# Patient Record
Sex: MALE | Race: WHITE | NOT HISPANIC OR LATINO | Employment: FULL TIME | URBAN - METROPOLITAN AREA
[De-identification: names, ages, dates, MRNs, and addresses within clinical notes are randomized per-mention and may not be internally consistent; named-entity substitution may affect disease eponyms.]

---

## 2017-03-01 ENCOUNTER — TRANSFERRED RECORDS (OUTPATIENT)
Dept: HEALTH INFORMATION MANAGEMENT | Facility: CLINIC | Age: 42
End: 2017-03-01

## 2017-03-09 ENCOUNTER — TRANSFERRED RECORDS (OUTPATIENT)
Dept: HEALTH INFORMATION MANAGEMENT | Facility: CLINIC | Age: 42
End: 2017-03-09

## 2017-04-07 ENCOUNTER — TRANSFERRED RECORDS (OUTPATIENT)
Dept: HEALTH INFORMATION MANAGEMENT | Facility: CLINIC | Age: 42
End: 2017-04-07

## 2017-04-11 ENCOUNTER — TRANSFERRED RECORDS (OUTPATIENT)
Dept: HEALTH INFORMATION MANAGEMENT | Facility: CLINIC | Age: 42
End: 2017-04-11

## 2017-04-20 ENCOUNTER — TRANSFERRED RECORDS (OUTPATIENT)
Dept: HEALTH INFORMATION MANAGEMENT | Facility: CLINIC | Age: 42
End: 2017-04-20

## 2019-04-29 ENCOUNTER — TRANSFERRED RECORDS (OUTPATIENT)
Dept: HEALTH INFORMATION MANAGEMENT | Facility: CLINIC | Age: 44
End: 2019-04-29

## 2019-11-05 ENCOUNTER — HEALTH MAINTENANCE LETTER (OUTPATIENT)
Age: 44
End: 2019-11-05

## 2020-11-22 ENCOUNTER — HEALTH MAINTENANCE LETTER (OUTPATIENT)
Age: 45
End: 2020-11-22

## 2021-04-28 ENCOUNTER — TRANSFERRED RECORDS (OUTPATIENT)
Dept: HEALTH INFORMATION MANAGEMENT | Facility: CLINIC | Age: 46
End: 2021-04-28

## 2021-09-19 ENCOUNTER — HEALTH MAINTENANCE LETTER (OUTPATIENT)
Age: 46
End: 2021-09-19

## 2021-10-20 ENCOUNTER — TRANSFERRED RECORDS (OUTPATIENT)
Dept: HEALTH INFORMATION MANAGEMENT | Facility: CLINIC | Age: 46
End: 2021-10-20

## 2021-11-10 ENCOUNTER — TRANSFERRED RECORDS (OUTPATIENT)
Dept: HEALTH INFORMATION MANAGEMENT | Facility: CLINIC | Age: 46
End: 2021-11-10

## 2021-11-11 ENCOUNTER — TRANSFERRED RECORDS (OUTPATIENT)
Dept: HEALTH INFORMATION MANAGEMENT | Facility: CLINIC | Age: 46
End: 2021-11-11

## 2022-01-09 ENCOUNTER — HEALTH MAINTENANCE LETTER (OUTPATIENT)
Age: 47
End: 2022-01-09

## 2022-09-30 ENCOUNTER — TRANSFERRED RECORDS (OUTPATIENT)
Dept: HEALTH INFORMATION MANAGEMENT | Facility: CLINIC | Age: 47
End: 2022-09-30

## 2022-10-19 ENCOUNTER — TRANSFERRED RECORDS (OUTPATIENT)
Dept: HEALTH INFORMATION MANAGEMENT | Facility: CLINIC | Age: 47
End: 2022-10-19

## 2022-10-21 ENCOUNTER — TELEPHONE (OUTPATIENT)
Dept: OPHTHALMOLOGY | Facility: CLINIC | Age: 47
End: 2022-10-21

## 2022-10-21 NOTE — TELEPHONE ENCOUNTER
Spoke with patient regarding scheduling for a sooner appointment form the wait-list. Rescheduled patient accordingly and patient is aware of date, time and location.-Per Patient

## 2022-10-24 NOTE — TELEPHONE ENCOUNTER
FUTURE VISIT INFORMATION      FUTURE VISIT INFORMATION:    Date: 10/31/22    Time: 9:00am    Location: [facility]  REFERRAL INFORMATION:    Referring providers clinic:  [facility]    Reason for visit/diagnosis  orbital reconstruction, silent sinus syndrome and sinking eyeball    RECORDS REQUESTED FROM:       Clinic name Comments Records Status Imaging Status   [facility] rec[facility] scanned into chart under 10/19/22 [facility]    Imaging Request sent to [facility] to CT Images

## 2022-10-31 ENCOUNTER — PRE VISIT (OUTPATIENT)
Dept: OPHTHALMOLOGY | Facility: CLINIC | Age: 47
End: 2022-10-31

## 2022-10-31 ENCOUNTER — ANCILLARY PROCEDURE (OUTPATIENT)
Dept: CT IMAGING | Facility: CLINIC | Age: 47
End: 2022-10-31
Attending: OPHTHALMOLOGY
Payer: COMMERCIAL

## 2022-10-31 ENCOUNTER — OFFICE VISIT (OUTPATIENT)
Dept: OPHTHALMOLOGY | Facility: CLINIC | Age: 47
End: 2022-10-31
Payer: COMMERCIAL

## 2022-10-31 DIAGNOSIS — H04.129 DRY EYE AFTER LASER IN SITU KERATOMILEUSIS: ICD-10-CM

## 2022-10-31 DIAGNOSIS — H25.013 CORTICAL AGE-RELATED CATARACT OF BOTH EYES: ICD-10-CM

## 2022-10-31 DIAGNOSIS — H05.411: ICD-10-CM

## 2022-10-31 DIAGNOSIS — H05.411: Primary | ICD-10-CM
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
## 2022-10-31 DIAGNOSIS — H59.89 DRY EYE AFTER LASER IN SITU KERATOMILEUSIS: ICD-10-CM

## 2022-10-31 PROCEDURE — 70480 CT ORBIT/EAR/FOSSA W/O DYE: CPT | Mod: GC | Performed by: RADIOLOGY

## 2022-10-31 PROCEDURE — 92285 EXTERNAL OCULAR PHOTOGRAPHY: CPT | Mod: GC | Performed by: OPHTHALMOLOGY

## 2022-10-31 PROCEDURE — 99204 OFFICE O/P NEW MOD 45 MIN: CPT | Mod: GC | Performed by: OPHTHALMOLOGY

## 2022-10-31 ASSESSMENT — TONOMETRY
OS_IOP_MMHG: 20
OD_IOP_MMHG: 21
IOP_METHOD: ICARE

## 2022-10-31 ASSESSMENT — VISUAL ACUITY
OS_SC+: -3
METHOD: SNELLEN - LINEAR
OD_SC: 20/20
OS_SC: 20/20

## 2022-10-31 ASSESSMENT — CUP TO DISC RATIO
OD_RATIO: 0.5
OS_RATIO: 0.5

## 2022-10-31 ASSESSMENT — CONF VISUAL FIELD
OD_NORMAL: 1
OS_NORMAL: 1
OD_SUPERIOR_NASAL_RESTRICTION: 0
METHOD: COUNTING FINGERS
OS_INFERIOR_NASAL_RESTRICTION: 0
OS_SUPERIOR_TEMPORAL_RESTRICTION: 0
OD_INFERIOR_TEMPORAL_RESTRICTION: 0
OS_SUPERIOR_NASAL_RESTRICTION: 0
OD_INFERIOR_NASAL_RESTRICTION: 0
OS_INFERIOR_TEMPORAL_RESTRICTION: 0
OD_SUPERIOR_TEMPORAL_RESTRICTION: 0

## 2022-10-31 ASSESSMENT — EXTERNAL EXAM - RIGHT EYE: OD_EXAM: ENOPHTHALMOS

## 2022-10-31 ASSESSMENT — SLIT LAMP EXAM - LIDS
COMMENTS: NORMAL
COMMENTS: MILD PERIORBITAL ERYTHEMA

## 2022-10-31 NOTE — TELEPHONE ENCOUNTER
Records Requested   October 31, 2022 10:03 AM  [id number]   Facility  [facility]   Phone: [phone]   Outcome Connected with medical records, imaging disc was mailed out on Friday 10/28:  11/10/2021 CT Sinus completed in house     Will look out for imaging disc, should be arriving sometime this week - Amay

## 2022-10-31 NOTE — PROGRESS NOTES
"Chief Complaints and History of Present Illnesses   Patient presents with     Consult For     Pt here for consult on Silent Sinus Syndrome and a sinking eyeball, referred by [facility].     Chief Complaint(s) and History of Present Illness(es)     Consult For     Additional comments: Pt here for consult on Silent Sinus Syndrome and a   sinking eyeball, referred by [facility].           Comments    Pt had previous sinus surgery for SSS in [place]. Pt has hx of LASIK each   eye. Vision is unchanged since last exam.   OSKAR SCHULZ 8:57 AM October 31, 2022                S/p silent sinus syndrome surgery (right side) 4/2017 in [place]  S/p septoplasty, left submucosal resection, bilateral ethmoidectomies, and bilateral maxillary antrostomies 12/30/21 - [facility]    Patient here to see us for bilateral blurry vision. He feels his vision is fluctuating. If he is still and objects are still then things are clear, but if things are in motion then it is \"blurry.\" currently he says a blurred halo around images. No diplopia or pain with EOMs. Symptoms are worse when driving at night.    He uses budesonide (steroid) nasal rinses for silent sinus syndrome BID since 2017.          Assessment & Plan     Iraj Ghosh is a 47 year old male with the following diagnoses:   1. Enophthalmos of right eye due to silent sinus syndrome    2. 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 Cortical age-related cataract of both eyes         History of silent sinus syndrome  - pt bothered by asymmetry and enophthalmos right eye and is interested in possible surgical correction  - unable to see outside CT from 11/2021. Patient states he has not had a postoperative scan  - baseline CT orbits with alicia protocol today  - pt would like to be referred to ENT at [facility] familiar with silent sinus syndrome. Will have him see Héctor.     Blurry vision, halos  - pt has chronic budesonide nasal spray use BID since 2017 for silent sinus " syndrome  - dilated today to evaluate for PSC cataracts given complaints of halos that are worse driving at night  - use ATs PRN     PLAN:  CT  Héctor  Return to clinic 1 month          Eden Earl MD  Oculoplastics Fellow    Attending Physician Attestation:  I have seen and examined this patient with the fellow .  I have confirmed and edited as necessary the chief complaint(s), history of present illness, review of systems, relevant history, and examination findings as documented by others.  I have personally reviewed the relevant tests, images, and reports as documented above.  I have confirmed and edited as necessary the assessment and plan and agree with this note.    - Davi Neil MD 9:53 AM 10/31/2022

## 2022-10-31 NOTE — TELEPHONE ENCOUNTER
FUTURE VISIT INFORMATION      FUTURE VISIT INFORMATION:    Date: 12/19/22    Time: 11:45am     [place] [facility]  [place]AL INFORMATION:    Referring provider:  Davi Neil MD    Referring providers clinic:  [facility]     Reason for visit/diagnosis  Dr. Jiménez or Dr. Anaya in ENT for silent sinus syndrome-Per     RECORDS REQUESTED FROM:       Clinic name Comments Records Status Imaging Status   [facility] 10/31/22- note from Davi Neil MD [facility]     Imaging  10/31/22- ct orbital        [facility] 11/10/21- ct sinus  Scanned in epic  11/7/22- Pending image    [facility] 6/14/21- note from Caroline Moore MD  Care everywhere                   Records Requested   November 7, 2022 2:39 PM   [facility]    Outcome Faxed request for image to be push to [facility].     11/16/22 3:20pm - Called [facility] to see if they can push image to [facility].- Danita     11/16/22 4:35pm- image in pacs

## 2022-10-31 NOTE — PATIENT INSTRUCTIONS
"ORBITAL FRACTURES    Seven bones of the face form a box that surrounds and protects most of the eye. This box is called the orbit. An orbital fracture is a break in one or more bones that surround the eye. A computed tomograph (\"CT scan\" or \"CAT scan\") is essential to fully characterize any orbital fracture. Above and behind the orbit is the brain. It is uncommon for adults to break the back or roof of the orbit; but if they occur, such fractures must usually be repaired  with the help of a brain surgeon.  The outside (lateral) edge of the orbit borders the temple region. Lateral wall fractures require tremendous force, are often associated with other facial fractures, and are commonly repaired, as most of us tend to lean or sleep against our lateral orbital walls, and repair of these fractures decreases the duration of tenderness.     The front edge of the orbit is called the orbital rim. Displaced fractures of the rim often cause significant changes in the orbital volume, causing the eye to appear sunken into the face. Rim fractures are often repaired for comfort, volume preservation, and to avoid palpable bumps in the bones around the eye.     The walls toward the nose and underneath the eye border the sinus cavities and are called the medial wall and floor, respectively. Fractures of these areas are most common, but not all such fractures need to be repaired. In general, fractures that involve greater than 50 % of the floor or greater than 30 % of the floor in conjunction with large fractures of the medial wall are likely to cause the eye to sink backward or downward and will probably require repair. Indications for urgent repair (as soon as possible) include capture or entrapment of an eye moving muscle into a fracture site (especially in young children), a displaced roof fracture, or a bone fragment pushing on the eye. Indications for delaying repair for at least 4 - 6 weeks include decreased vision from optic " nerve injury, recent eye surgery, or significant injury to the eye. In all other cases, the optimum time for repair is 1 - 2 weeks after injury. Repairs beyond this time are certainly feasible in experienced hands, but they require more work. Floor fractures will often cause a numbness along the cheek, side of the nose, and the lip. Often, the normal nerve function will return after as long as 6 - 9 months. In some cases, repair of a floor fracture may improve nerve function, but can, in rare instances, worsen it.     If you have an orbital fracture, DO NOT BLOW YOUR NOSE for at least 3 weeks. You may cause air to become trapped behind the eye, possibly damaging your vision. Avoid any activity that will turn your face red with exertion for 2 weeks, as you could cause a hemorrhage behind your eye. To speed the resolution of your bruises, apply ice for 48 hours and then warm compresses. Special cases may require antibiotic therapy as well.      If surgery is recommended to you, and you choose to have your fracture repaired, you should know the followin % of all orbital fractures can be repaired with a skin incision that is less than 1/4 of an inch long at the corner of your eye. Surgery is  performed under general anesthesia and requires 30 - 90 minutes of operating time,  depending upon the severity of the injury. The majority of patients go home the same day or stay in the hospital overnight. Most patients can return to normal activities in 1 to 2 weeks.

## 2022-11-01 ENCOUNTER — OFFICE VISIT (OUTPATIENT)
Dept: OPHTHALMOLOGY | Facility: CLINIC | Age: 47
End: 2022-11-01
Attending: OPHTHALMOLOGY
Payer: COMMERCIAL

## 2022-11-01 DIAGNOSIS — Z98.890 H/O LASER ASSISTED IN SITU KERATOMILEUSIS: ICD-10-CM

## 2022-11-01 DIAGNOSIS — H18.513 FUCHS' CORNEAL DYSTROPHY OF BOTH EYES: ICD-10-CM

## 2022-11-01 DIAGNOSIS — H18.523 ANTERIOR BASEMENT MEMBRANE DYSTROPHY (ABMD) OF BOTH EYES: ICD-10-CM

## 2022-11-01 DIAGNOSIS — H25.013 CORTICAL AGE-RELATED CATARACT OF BOTH EYES: Primary | ICD-10-CM

## 2022-11-01 DIAGNOSIS — D31.32 CHOROIDAL NEVUS OF LEFT EYE: ICD-10-CM

## 2022-11-01 PROCEDURE — 92015 DETERMINE REFRACTIVE STATE: CPT

## 2022-11-01 PROCEDURE — G0463 HOSPITAL OUTPT CLINIC VISIT: HCPCS | Mod: 25

## 2022-11-01 PROCEDURE — 92250 FUNDUS PHOTOGRAPHY W/I&R: CPT | Performed by: OPHTHALMOLOGY

## 2022-11-01 PROCEDURE — 92025 CPTRIZED CORNEAL TOPOGRAPHY: CPT | Performed by: OPHTHALMOLOGY

## 2022-11-01 PROCEDURE — 76512 OPH US DX B-SCAN: CPT | Performed by: OPHTHALMOLOGY

## 2022-11-01 PROCEDURE — 76519 ECHO EXAM OF EYE: CPT | Performed by: OPHTHALMOLOGY

## 2022-11-01 PROCEDURE — 99214 OFFICE O/P EST MOD 30 MIN: CPT | Performed by: OPHTHALMOLOGY

## 2022-11-01 RX ORDER — ALBUTEROL SULFATE 90 UG/1
AEROSOL, METERED RESPIRATORY (INHALATION) PRN
COMMUNITY
Start: 2021-12-17

## 2022-11-01 RX ORDER — FLUOXETINE 10 MG/1
10 CAPSULE ORAL
COMMUNITY
Start: 2022-06-22 | End: 2023-05-23

## 2022-11-01 RX ORDER — CLONAZEPAM 0.5 MG/1
TABLET ORAL
COMMUNITY
Start: 2022-10-28 | End: 2023-05-23

## 2022-11-01 RX ORDER — BUDESONIDE 1 MG/2ML
INHALANT ORAL
COMMUNITY
Start: 2022-10-28

## 2022-11-01 RX ORDER — PHENTERMINE HYDROCHLORIDE 37.5 MG/1
TABLET ORAL
COMMUNITY
Start: 2022-10-19

## 2022-11-01 RX ORDER — ZOLPIDEM TARTRATE 5 MG/1
5 TABLET ORAL PRN
COMMUNITY
Start: 2022-06-22 | End: 2023-05-23

## 2022-11-01 ASSESSMENT — REFRACTION_MANIFEST
OD_CYLINDER: +0.50
OD_ADD: +2.50
OS_ADD: +2.50
OS_AXIS: 005
OS_SPHERE: -0.75
OD_AXIS: 140
OD_SPHERE: -0.75
OS_CYLINDER: +0.75

## 2022-11-01 ASSESSMENT — VISUAL ACUITY
OS_SC+: -2
OD_BAT_HIGH: 20/20-2
OS_BAT_MED: 20/25
OD_SC+: -3
OD_BAT_LOW: 20/15
OD_SC: 20/20
OS_SC: 20/20
METHOD: SNELLEN - LINEAR
OS_BAT_LOW: 20/25
OS_BAT_HIGH: 20/30
OD_BAT_MED: 20/20

## 2022-11-01 ASSESSMENT — CONF VISUAL FIELD
METHOD: COUNTING FINGERS
OS_INFERIOR_NASAL_RESTRICTION: 0
OS_NORMAL: 1
OS_SUPERIOR_NASAL_RESTRICTION: 0
OS_INFERIOR_TEMPORAL_RESTRICTION: 0
OD_INFERIOR_NASAL_RESTRICTION: 0
OD_SUPERIOR_TEMPORAL_RESTRICTION: 0
OS_SUPERIOR_TEMPORAL_RESTRICTION: 0
OD_INFERIOR_TEMPORAL_RESTRICTION: 0
OD_NORMAL: 1
OD_SUPERIOR_NASAL_RESTRICTION: 0

## 2022-11-01 ASSESSMENT — TONOMETRY
IOP_METHOD: TONOPEN
OD_IOP_MMHG: 19
OS_IOP_MMHG: 22

## 2022-11-01 ASSESSMENT — CUP TO DISC RATIO
OD_RATIO: 0.5
OS_RATIO: 0.5

## 2022-11-01 ASSESSMENT — EXTERNAL EXAM - LEFT EYE: OS_EXAM: NORMAL

## 2022-11-01 ASSESSMENT — EXTERNAL EXAM - RIGHT EYE: OD_EXAM: ENOPHTHALMOS

## 2022-11-01 NOTE — PROGRESS NOTES
Chief Complaint(s) and History of Present Illness(es)     Cataract Evaluation           Comments    Pt states vision has been blurry both distance and near. Pt does not use   OTC readers for near. Pt s/p Lasik BE 1998.   No redness or dryness. No flashes or floaters.    ANASTASIIA Robbins November 1, 2022 2:16 PM                   Review of systems for the eyes was negative other than the pertinent positives/negatives listed in the HPI.      Assessment & Plan      Iraj Ghosh is a 47 year old male with the following diagnoses:   1. Cortical age-related cataract of both eyes    2. 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 Fuchs' corneal dystrophy of both eyes    4. H/O laser assisted in situ keratomileusis - Both Eyes    5. Choroidal nevus of left eye       New patient here for cataract opinion  Seen at [facility] for many years.  Most recent visit with [facility]  S/P myopic laser in situ keratomileusis (LASIK) in 1998 ([facility]) both eyes   Noting worsening vision left eye > right eye in the past year  Tried soft contact lenses without tolerance or improved vision  Tried progressive bifocals, but did not feel vision improved in either eye    Exam today with minimal cataract in either eye  Discussed findings of K dystrophy as above  Recommend hard lens eval to assess visual potential     Incidental large choroidal nevi noted left eye  Patient does not think this was ever seen prior  Photos and Bscan today without evidence of thickening or orange pigment  Obtain outside records  Refer to retina clinic for eval        Patient disposition:   Return for Retina clinic next available; Deana for hard lens eval with Mcelroy visit after.           Attending Physician Attestation:  Complete documentation of historical and exam elements from today's encounter can be found in the full encounter summary report (not reduplicated in this progress note).  I personally obtained the chief  complaint(s) and history of present illness.  I confirmed and edited as necessary the review of systems, past medical/surgical history, family history, social history, and examination findings as documented by others; and I examined the patient myself.  I personally reviewed the relevant tests, images, and reports as documented above.  I formulated and edited as necessary the assessment and plan and discussed the findings and management plan with the patient and family. I spent a total of 30 minutes face to face with the patient.  Over 50% of this time was spent counseling and coordinating care regarding their diagnosis and management.   . - Alexander Mcelroy MD

## 2022-11-04 ENCOUNTER — TRANSCRIBE ORDERS (OUTPATIENT)
Dept: OTHER | Age: 47
End: 2022-11-04

## 2022-11-04 DIAGNOSIS — J32.0 CHRONIC MAXILLARY SINUSITIS: Primary | ICD-10-CM

## 2022-11-08 ENCOUNTER — TELEPHONE (OUTPATIENT)
Dept: OPHTHALMOLOGY | Facility: CLINIC | Age: 47
End: 2022-11-08

## 2022-11-08 NOTE — TELEPHONE ENCOUNTER
Spoke with patient regarding scheduling for a sooner appointment with  (Dr. Neil appointment has to be after ENT Appointment) and Dr. Wiggins. Patient was able to schedule as offered and was sent a new AVS Printout. Patient will also see appointments in [facility].-Per Patient

## 2022-11-17 ENCOUNTER — OFFICE VISIT (OUTPATIENT)
Dept: OPHTHALMOLOGY | Facility: CLINIC | Age: 47
End: 2022-11-17
Payer: COMMERCIAL

## 2022-11-17 DIAGNOSIS — H18.523 ANTERIOR BASEMENT MEMBRANE DYSTROPHY (ABMD) OF BOTH EYES: ICD-10-CM

## 2022-11-17 DIAGNOSIS — H52.213 IRREGULAR ASTIGMATISM OF BOTH EYES: Primary | ICD-10-CM

## 2022-11-17 PROCEDURE — 99203 OFFICE O/P NEW LOW 30 MIN: CPT | Performed by: OPTOMETRIST

## 2022-11-17 ASSESSMENT — REFRACTION_CURRENTRX
OD_BRAND: VS SCLERAL
OS_BASECURVE: 8.4
OD_DIAMETER: 14.9
OS_SPHERE: -2.50
OS_BRAND: VS SCLERAL
OS_SPHERE: PLANO
OD_DIAMETER: 16.0
OS_BRAND: ONEFIT
OD_BASECURVE: 7.6
OS_DIAMETER: 14.9
OS_DIAMETER: 16.0
OD_SPHERE: -3.00
OD_BRAND: ONEFIT
OS_BASECURVE: 7.7
OD_BASECURVE: 8.4
OD_SPHERE: PLANO

## 2022-11-17 ASSESSMENT — VISUAL ACUITY
OS_SC+: -3
METHOD: SNELLEN - LINEAR
OD_SC+: -3
OD_SC: 20/20
OS_SC: 20/20

## 2022-11-17 ASSESSMENT — CONF VISUAL FIELD
OD_INFERIOR_TEMPORAL_RESTRICTION: 0
OD_NORMAL: 1
OS_SUPERIOR_TEMPORAL_RESTRICTION: 0
OD_SUPERIOR_TEMPORAL_RESTRICTION: 0
OD_SUPERIOR_NASAL_RESTRICTION: 0
OS_INFERIOR_NASAL_RESTRICTION: 0
OS_INFERIOR_TEMPORAL_RESTRICTION: 0
OS_NORMAL: 1
OS_SUPERIOR_NASAL_RESTRICTION: 0
OD_INFERIOR_NASAL_RESTRICTION: 0

## 2022-11-17 ASSESSMENT — EXTERNAL EXAM - RIGHT EYE: OD_EXAM: ENOPHTHALMOS

## 2022-11-17 ASSESSMENT — TONOMETRY
OS_IOP_MMHG: 15
IOP_METHOD: ICARE
OD_IOP_MMHG: 15

## 2022-11-17 ASSESSMENT — SLIT LAMP EXAM - LIDS
COMMENTS: DERMATOCHALASIS
COMMENTS: DERMATOCHALASIS

## 2022-11-17 ASSESSMENT — EXTERNAL EXAM - LEFT EYE: OS_EXAM: NORMAL

## 2022-11-17 NOTE — PROGRESS NOTES
"A/P  1.) Irregular astigmatism OU  -Likely 2' to ABMD OU  -Symptomatic for blurred vision, not \"crisp\" left eye>right eye  -Scleral lens trial today, did appreciate increased clarity on trial frame with lenses. Required FST left eye>right eye  -Reviewed findings with pt including likely need for several lens revisions. Aim for DVO and readers over. He would like to proceed    Order lenses, RTC 2-3 weeks for lens dispense, I&R    I have confirmed the patient's CC, HPI and reviewed Past Medical History, Past Surgical History, Social History, Family History, Problem List, Medication List and agree with Tech note.     Li Wiggins, OD FAAO FSLS    "

## 2022-11-17 NOTE — NURSING NOTE
Chief Complaints and History of Present Illnesses   Patient presents with     Contact Lens Evaluation     Chief Complaint(s) and History of Present Illness(es)     Contact Lens Evaluation            Associated symptoms: Negative for eye pain, redness, flashes and floaters          Comments    Patient states he tried contact lenses about 3 months ago and states his vision was still blurry each eye. Patient states the lenses were comfortable but he didn't see well out of them. Patient was told his cornea is a shape that he might need to try a hard lens.     Ruthie Collado, OSKAR November 17, 2022 9:36 AM

## 2022-11-17 NOTE — PATIENT INSTRUCTIONS
What is a scleral lens?  A scleral lens is a large diameter rigid gas permeable contact lens that vaults completely over the cornea (clear tissue on the front of the eye) and touches only on the white part of the eye (sclera). A liquid layer between the lens and the cornea lubricates the ocular surface and protects from physical rubbing/irritation on the sensitive cornea. These lenses are helpful for a variety of conditions including dry eyes and irregularly shaped corneas.    How are they different than small RGP lenses?  Scleral lenses do not move much in the eye and do not touch the sensitive cornea, so they tend to be much easier to get used to. Insertion is quite different, but removal is similar. To insert them properly they need to be filled with preservative free saline    How do you insert and remove them?  See this website for video demonstration: https://sclerallens.org/for-patients/patient-videos/   We will start you out with all the solutions you need at first, but you will need to buy more if you continue to wear lenses.    How long do they last?  Once the final fit is obtained (the best lens shape for your eye may require several revisions), lenses can last from 1-2 years depending on your eyes.    Does insurance cover them?  Sometimes - this is very dependent on your insurance and which diagnoses they are for. It is usually best if you check directly with your insurance, however we are able to help you with this: call [phone]. We always submit medically necessary lenses to insurance but if your insurance does not cover them you may be responsible for them.     What happens if I lose or break a lens?  In the first 90 days after initial order you are covered under the manufacturing lab's warranty. Please just call the clinic to let us know. After 90 days we typically need to order a new lens, which will carry a new charge. Custom lenses take 1-2 weeks to arrive, so if you cannot function well  without a lens it may be worth getting a backup.

## 2022-11-20 ENCOUNTER — HEALTH MAINTENANCE LETTER (OUTPATIENT)
Age: 47
End: 2022-11-20

## 2022-11-21 ENCOUNTER — OFFICE VISIT (OUTPATIENT)
Dept: OTOLARYNGOLOGY | Facility: CLINIC | Age: 47
End: 2022-11-21
Payer: COMMERCIAL

## 2022-11-21 VITALS
HEIGHT: 70 IN | DIASTOLIC BLOOD PRESSURE: 91 MMHG | SYSTOLIC BLOOD PRESSURE: 133 MMHG | RESPIRATION RATE: 16 BRPM | BODY MASS INDEX: 33.5 KG/M2 | HEART RATE: 71 BPM | TEMPERATURE: 97.8 F | WEIGHT: 234 LBS | OXYGEN SATURATION: 98 %

## 2022-11-21 DIAGNOSIS — J34.89 SILENT SINUS SYNDROME: Primary | ICD-10-CM

## 2022-11-21 DIAGNOSIS — J32.0 CHRONIC MAXILLARY SINUSITIS: ICD-10-CM

## 2022-11-21 PROCEDURE — 99202 OFFICE O/P NEW SF 15 MIN: CPT | Mod: 25 | Performed by: OTOLARYNGOLOGY

## 2022-11-21 PROCEDURE — 31231 NASAL ENDOSCOPY DX: CPT | Performed by: OTOLARYNGOLOGY

## 2022-11-21 ASSESSMENT — PAIN SCALES - GENERAL: PAINLEVEL: NO PAIN (0)

## 2022-11-21 NOTE — PATIENT INSTRUCTIONS
1.  You were seen in the ENT Clinic today by . If you have any questions or concerns after your appointment, please call [phone]. Press option #1 for scheduling related needs. Press option #3 for Nurse advice.    2.  Plan is to return to clinic as needed.      Beatriz Turner LPN  [phone]  [facility] Otolaryngology

## 2022-11-21 NOTE — LETTER
2022       RE: Iraj Ghohs  [address] [place] [address]     Dear Colleague,    Thank you for referring your patient, Iraj Ghosh, to the [facility] [place] at [facility]. Please see a copy of my visit note below.    Otolaryngology Adult Consultation    Patient: Iraj Ghosh   : 1975     Patient presents with:  Consult: No chief complaint on file.       Referring Provider: Provider Not In System   Consulting Physician:  Louisa Jiménez MD       Assessment/Plan: Patient with treated silent sinus syndrome. Imaging and endoscopy confirm patency of sinus. For ongoing intermittent sinus symptoms, recommend saline irrigations and nasal steroid spray. Patient comfortable with this plan. Will see Dr. Neil to discuss further management of the orbit.      HPI: Iraj Ghosh is a 47 year old male seen today in the Otolaryngology Clinic in consultation from Dr. Neil in oculoplastics for history of silent sinus syndrome. Several years ago this was identified and treated in [place] in . He also describes a second procedure done with [facility], septoplasty, sinus and turbinate surgery in . He wants to be sure the silent sinus syndrome is adequately treated and to discuss his current sinus symptoms.     budesonide (PULMICORT) 1 MG/2ML neb solution,   clonazePAM (KLONOPIN) 0.5 MG tablet,   FLUoxetine (PROZAC) 10 MG capsule, Take 10 mg by mouth  phentermine (ADIPEX-P) 37.5 MG tablet,   VENTOLIN  (90 Base) MCG/ACT inhaler, as needed  vitamin D3 (CHOLECALCIFEROL) 1.25 MG (54259 UT) capsule, Take 50,000 Units by mouth  zolpidem (AMBIEN) 5 MG tablet, Take 5 mg by mouth as needed    No current facility-administered medications on file prior to visit.       No Known Allergies    Past Medical History:   Diagnosis Date     Allergic rhinitis, cause unspecified      Anxiety      Depressive disorder       Diaphragmatic hernia without mention of obstruction or gangrene      Gastro-oesophageal reflux disease      Perforated abdominal viscus 3/10/2015     Reflux esophagitis      Rosacea      Sepsis, unspecified 3/19/2015    Sepsis due to perforated diverticulitis with abscess.      Stricture and stenosis of esophagus        Social History     Occupational History     Not on file   Tobacco Use     Smoking status: Former     Types: Dip, chew, snus or snuff     Smokeless tobacco: Former     Types: Chew   Substance and Sexual Activity     Alcohol use: Not Currently     Comment: 12 weekly     Drug use: Not Currently     Types: Marijuana     Sexual activity: Yes     Partners: Male     Birth control/protection: Condom, None        Review of Systems  [facility] ROS 11/16/2022   Constitutional: Unexplained fatigue   Neurology: Dizzy spells, Headache   Eyes: Visual loss   Ears, Nose, Throat: Ringing/noise in ears, Nasal congestion or drainage        14 point ROS neg other than the symptoms noted above.    Physical Exam:    General Assessment   The patient is alert, oriented and in no acute distress.   Head/Face/Scalp  Grossly normal. Asymmetry of eye closure, mild R hypophthalmus.   Nose  External nose is straight, skin is normal. Intranasal exam (anterior rhinoscopy) reveals normal moist mucosa, turbinate tissue without edema, erythema or crusting.  Septum mainly in the midline.      Procedure:  Endoscopy indicated for exam of sinuses  Topical anesthetic/decongestant spray applied.  Rigid scope used for visualization.  Findings: used zero and 70 degree scopes. Sinus is patent on both sides, no need for further surgical correction.     Imaging:    Most recent CT shows mucosal thickening, resolved on exam today and absent on prior CT.  Mild mucosal thickening in R ethmoid/frontal recess.     20 minutes spent on the date of the encounter doing chart review, history and exam, documentation and further activities per the note. This time  is in addition to separately billable procedure.      Again, thank you for allowing me to participate in the care of your patient.      Sincerely,    Louisa Jiménez MD

## 2022-11-21 NOTE — PROGRESS NOTES
Otolaryngology Adult Consultation    Patient: Iraj Ghosh   : 1975     Patient presents with:  Consult: No chief complaint on file.       Referring Provider: Provider Not In System   Consulting Physician:  Louisa Jiménez MD       Assessment/Plan: Patient with treated silent sinus syndrome. Imaging and endoscopy confirm patency of sinus. For ongoing intermittent sinus symptoms, recommend saline irrigations and nasal steroid spray. Patient comfortable with this plan. Will see Dr. Neil to discuss further management of the orbit.      HPI: Iraj Ghosh is a 47 year old male seen today in the Otolaryngology Clinic in consultation from Dr. Neil in oculoplastics for history of silent sinus syndrome. Several years ago this was identified and treated in [place] in . He also describes a second procedure done with [facility], septoplasty, sinus and turbinate surgery in . He wants to be sure the silent sinus syndrome is adequately treated and to discuss his current sinus symptoms.     budesonide (PULMICORT) 1 MG/2ML neb solution,   clonazePAM (KLONOPIN) 0.5 MG tablet,   FLUoxetine (PROZAC) 10 MG capsule, Take 10 mg by mouth  phentermine (ADIPEX-P) 37.5 MG tablet,   VENTOLIN  (90 Base) MCG/ACT inhaler, as needed  vitamin D3 (CHOLECALCIFEROL) 1.25 MG (19641 UT) capsule, Take 50,000 Units by mouth  zolpidem (AMBIEN) 5 MG tablet, Take 5 mg by mouth as needed    No current facility-administered medications on file prior to visit.       No Known Allergies    Past Medical History:   Diagnosis Date     Allergic rhinitis, cause unspecified      Anxiety      Depressive disorder      Diaphragmatic hernia without mention of obstruction or gangrene      Gastro-oesophageal reflux disease      Perforated abdominal viscus 3/10/2015     Reflux esophagitis      Rosacea      Sepsis, unspecified 3/19/2015    Sepsis due to perforated diverticulitis with abscess.      Stricture and stenosis of esophagus         Social History     Occupational History     Not on file   Tobacco Use     Smoking status: Former     Types: Dip, chew, snus or snuff     Smokeless tobacco: Former     Types: Chew   Substance and Sexual Activity     Alcohol use: Not Currently     Comment: 12 weekly     Drug use: Not Currently     Types: Marijuana     Sexual activity: Yes     Partners: Male     Birth control/protection: Condom, None        Review of Systems  [facility] ROS 11/16/2022   Constitutional: Unexplained fatigue   Neurology: Dizzy spells, Headache   Eyes: Visual loss   Ears, Nose, Throat: Ringing/noise in ears, Nasal congestion or drainage        14 point ROS neg other than the symptoms noted above.    Physical Exam:    General Assessment   The patient is alert, oriented and in no acute distress.   Head/Face/Scalp  Grossly normal. Asymmetry of eye closure, mild R hypophthalmus.   Nose  External nose is straight, skin is normal. Intranasal exam (anterior rhinoscopy) reveals normal moist mucosa, turbinate tissue without edema, erythema or crusting.  Septum mainly in the midline.      Procedure:  Endoscopy indicated for exam of sinuses  Topical anesthetic/decongestant spray applied.  Rigid scope used for visualization.  Findings: used zero and 70 degree scopes. Sinus is patent on both sides, no need for further surgical correction.     Imaging:    Most recent CT shows mucosal thickening, resolved on exam today and absent on prior CT.  Mild mucosal thickening in R ethmoid/frontal recess.     20 minutes spent on the date of the encounter doing chart review, history and exam, documentation and further activities per the note. This time is in addition to separately billable procedure.

## 2022-11-29 DIAGNOSIS — D31.32 CHOROIDAL NEVUS OF LEFT EYE: Primary | ICD-10-CM

## 2022-12-07 ENCOUNTER — OFFICE VISIT (OUTPATIENT)
Dept: OPHTHALMOLOGY | Facility: CLINIC | Age: 47
End: 2022-12-07
Attending: OPHTHALMOLOGY
Payer: COMMERCIAL

## 2022-12-07 ENCOUNTER — OFFICE VISIT (OUTPATIENT)
Dept: OPTOMETRY | Facility: CLINIC | Age: 47
End: 2022-12-07
Payer: COMMERCIAL

## 2022-12-07 DIAGNOSIS — H18.523 ANTERIOR BASEMENT MEMBRANE DYSTROPHY (ABMD) OF BOTH EYES: ICD-10-CM
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
## 2022-12-07 DIAGNOSIS — D31.32 CHOROIDAL NEVUS OF LEFT EYE: Primary | ICD-10-CM

## 2022-12-07 DIAGNOSIS — H04.129 DRY EYE AFTER LASER IN SITU KERATOMILEUSIS: ICD-10-CM

## 2022-12-07 DIAGNOSIS — D31.32 CHOROIDAL NEVUS OF LEFT EYE: ICD-10-CM

## 2022-12-07 DIAGNOSIS — H59.89 DRY EYE AFTER LASER IN SITU KERATOMILEUSIS: ICD-10-CM

## 2022-12-07 DIAGNOSIS — Z98.890 H/O LASER ASSISTED IN SITU KERATOMILEUSIS: ICD-10-CM

## 2022-12-07 DIAGNOSIS — H52.213 IRREGULAR ASTIGMATISM OF BOTH EYES: Primary | ICD-10-CM

## 2022-12-07 DIAGNOSIS — H25.13 NUCLEAR SENILE CATARACT OF BOTH EYES: ICD-10-CM

## 2022-12-07 PROCEDURE — 92014 COMPRE OPH EXAM EST PT 1/>: CPT | Performed by: OPHTHALMOLOGY

## 2022-12-07 PROCEDURE — 92134 CPTRZ OPH DX IMG PST SGM RTA: CPT | Performed by: OPHTHALMOLOGY

## 2022-12-07 PROCEDURE — G0463 HOSPITAL OUTPT CLINIC VISIT: HCPCS | Mod: 25

## 2022-12-07 PROCEDURE — 92250 FUNDUS PHOTOGRAPHY W/I&R: CPT | Performed by: OPHTHALMOLOGY

## 2022-12-07 PROCEDURE — 99213 OFFICE O/P EST LOW 20 MIN: CPT | Mod: 25 | Performed by: OPHTHALMOLOGY

## 2022-12-07 PROCEDURE — 76512 OPH US DX B-SCAN: CPT | Performed by: OPHTHALMOLOGY

## 2022-12-07 PROCEDURE — 999N000103 HC STATISTIC NO CHARGE FACILITY FEE

## 2022-12-07 ASSESSMENT — REFRACTION_CURRENTRX
OS_AXIS: 035
OS_CYLINDER: -1.25
OS_ADDL_SPECS: OPT EXTRA BLUE, HYDRAPEG
OS_BASECURVE: 4.2/7.67
OD_BASECURVE: 4.1/7.5
OD_SPHERE: -1.25
OD_AXIS: 140
OS_DIAMETER: 15.4
OD_CYLINDER: -0.75
OD_DIAMETER: 14.9
OS_DIAMETER: 14.9
OD_SPHERE: -2.00
OS_SPHERE: -1.12
OD_DIAMETER: 14.8
OS_BASECURVE: 7.9
OS_BRAND: ONEFIT (AXIS ADJUSTED)
OD_BRAND: ONEFIT (AXIS ADJUSTED)
OD_ADDL_SPECS: OPT EXTRA CLEAR, HYDRAPEG
OS_SPHERE: -2.00
OD_BASECURVE: 7.9

## 2022-12-07 ASSESSMENT — CONF VISUAL FIELD
OD_INFERIOR_TEMPORAL_RESTRICTION: 0
OD_SUPERIOR_NASAL_RESTRICTION: 0
OD_NORMAL: 1
OS_SUPERIOR_TEMPORAL_RESTRICTION: 0
OS_INFERIOR_TEMPORAL_RESTRICTION: 0
OS_SUPERIOR_TEMPORAL_RESTRICTION: 0
OD_SUPERIOR_TEMPORAL_RESTRICTION: 0
OS_NORMAL: 1
OS_NORMAL: 1
OS_INFERIOR_NASAL_RESTRICTION: 0
OD_SUPERIOR_NASAL_RESTRICTION: 0
OD_SUPERIOR_TEMPORAL_RESTRICTION: 0
OD_NORMAL: 1
OD_INFERIOR_TEMPORAL_RESTRICTION: 0
OD_INFERIOR_NASAL_RESTRICTION: 0
OS_SUPERIOR_NASAL_RESTRICTION: 0
OS_INFERIOR_TEMPORAL_RESTRICTION: 0
OS_INFERIOR_NASAL_RESTRICTION: 0
OD_INFERIOR_NASAL_RESTRICTION: 0
OS_SUPERIOR_NASAL_RESTRICTION: 0

## 2022-12-07 ASSESSMENT — EXTERNAL EXAM - LEFT EYE
OS_EXAM: NORMAL
OS_EXAM: NORMAL

## 2022-12-07 ASSESSMENT — VISUAL ACUITY
METHOD: SNELLEN - LINEAR
OS_SC: 20/25
METHOD: SNELLEN - LINEAR
OD_SC: 20/25
OD_SC: 20/25
OS_SC: 20/25
OD_SC: 20/25
METHOD: SNELLEN - LINEAR
OS_SC: 20/25

## 2022-12-07 ASSESSMENT — CUP TO DISC RATIO
OS_RATIO: 0.5
OD_RATIO: 0.5

## 2022-12-07 ASSESSMENT — SLIT LAMP EXAM - LIDS
COMMENTS: DERMATOCHALASIS

## 2022-12-07 ASSESSMENT — EXTERNAL EXAM - RIGHT EYE
OD_EXAM: ENOPHTHALMOS
OD_EXAM: ENOPHTHALMOS

## 2022-12-07 ASSESSMENT — TONOMETRY
OD_IOP_MMHG: 14
OD_IOP_MMHG: 14
OS_IOP_MMHG: 15
OS_IOP_MMHG: 15
IOP_METHOD: ICARE
OS_IOP_MMHG: 19
IOP_METHOD: TONOPEN
OD_IOP_MMHG: 19
IOP_METHOD: TONOPEN

## 2022-12-07 NOTE — PROGRESS NOTES
Chief Complaint(s) and History of Present Illness(es)     Follow Up            Laterality: both eyes    Quality: blurred vision    Frequency: constantly    Timing: throughout the day    Associated symptoms: Negative for eye pain, pain with eye movement,   flashes and floaters    Treatments tried: no treatments    Pain scale: 0/10    Comments: Blurred vision BE   Seen Dr Wiggins today to get scleral lens dispensed. Pt states did not   work out poor fit she will reorder new lenses.   Has evaluation w retina also.  Wilma SCHMITT 9:32 AM December 7, 2022                     Review of systems for the eyes was negative other than the pertinent positives/negatives listed in the HPI.      Assessment & Plan      Iraj Ghosh is a 47 year old male with the following diagnoses:   1. Irregular astigmatism of both eyes    2. 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 H/O laser assisted in situ keratomileusis - Both Eyes    4. Choroidal nevus of left eye         Improved vision with scleral lens.  Continuing to work on fit with Dr. MILLER  Cataracts minimal and not visually significant at this time  Monitor     Seen by Dr. Mcmahon for choroidal nevi left eye today  Plans to monitor Q6 mo      Patient disposition:   Continue to follow with [facility].  Can return to [facility] as needed          Attending Physician Attestation:  Complete documentation of historical and exam elements from today's encounter can be found in the full encounter summary report (not reduplicated in this progress note).  I personally obtained the chief complaint(s) and history of present illness.  I confirmed and edited as necessary the review of systems, past medical/surgical history, family history, social history, and examination findings as documented by others; and I examined the patient myself.  I personally reviewed the relevant tests, images, and reports as documented above.  I formulated and edited as necessary the  assessment and plan and discussed the findings and management plan with the patient and family. . - Alexander Mcelroy MD

## 2022-12-07 NOTE — PROGRESS NOTES
"A/P  1.) Irregular astigmatism OU  -Likely 2' to ABMD OU  -Symptomatic for blurred vision, not \"crisp\" left eye>right eye  -Scleral lens trial last exam, did appreciate increased clarity on trial frame with lenses. Required FST left eye>right eye  -Some rotation with FST on today's lenses. If unable to stabilize rotation may not do well in CL's. Some better stability with Zenlens RT toric  -Successful I&R, reviewed CL care and hygiene with pt (Radisson Simplus, Addipak/Purilens).   -DVO and readers over    Order Zenlenses and mail direct to pt. F/u 3-4 weeks, wearing lenses in if able    Contact Lens Billing  V-Code:  - GP scleral  Final Contact Lens Rx       Brand Base Curve Diameter Sphere Cylinder Axis Lens Addl. Specs    Right Zenlens RC 4.1/7.5 14.8 -3.00 -1.00 135 2 flat H x 2 steep V Radisson XO clear, HydraPEG    Left Zenlens RC 4.1/7.5 14.8 -3.25 -1.50 045 2 flat H x 2 steep V Radisson XO blue, HydraPEG         # of units: 2  Price per Unit: $250    This patient requires contact lenses that are medically necessary for either improvement in vision over spectacles, support of the ocular surface, or other therapeutic benefit. These are not cosmetic contact lenses.     Encounter Diagnosis   Name Primary?     Irregular astigmatism of both eyes Yes       "

## 2022-12-07 NOTE — NURSING NOTE
Chief Complaints and History of Present Illnesses   Patient presents with     Follow Up     Blurred vision BE   Seen Dr Wiggins today to get scleral lens dispensed. Pt states did not work out poor fit she will reorder new lenses.   Has evaluation w retina also.  Wilma SCHMITT 9:32 AM December 7, 2022              Chief Complaint(s) and History of Present Illness(es)     Follow Up            Laterality: both eyes    Quality: blurred vision    Frequency: constantly    Timing: throughout the day    Associated symptoms: Negative for eye pain, pain with eye movement, flashes and floaters    Treatments tried: no treatments    Pain scale: 0/10    Comments: Blurred vision BE   Seen Dr Wiggins today to get scleral lens dispensed. Pt states did not work out poor fit she will reorder new lenses.   Has evaluation w retina also.  Wilma SCHMITT 9:32 AM December 7, 2022

## 2022-12-07 NOTE — NURSING NOTE
Chief Complaints and History of Present Illnesses   Patient presents with     Retinal Evaluation     Chief Complaint(s) and History of Present Illness(es)     Retinal Evaluation            Laterality: left eye    Quality: blurred vision    Severity: severe    Frequency: constantly    Timing: throughout the day    Associated symptoms: Negative for eye pain, pain with eye movement, flashes and floaters    Treatments tried: no treatments    Pain scale: 0/10          Comments     Referred for incidental large choroidal nevi left eye  Blurred vision BE  Seen Dr Wiggins today to get scleral lens dispensed. Pt states did not work out poor fit she will reorder new lenses.  Wilma Tineo COA 9:17 AM December 7, 2022

## 2022-12-07 NOTE — PROGRESS NOTES
CC -   Evaluation of nevus, OS    INTERVAL HISTORY - Initial visit    PMH -   Iraj Ghosh is a 47 year old male here for evaluation of nevus of the left eye. He was referred by Dr. 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       PAST MEDICAL HISTORY:  Silent sinus syndrome (follows with Jamel)    PAST OCULAR SURGERY:  Silent sinus syndrome surgery (right) 4/2017 (in [place])  Multiple sinus surgery (bilateral) - 12/2021 ([facility])  Myopic LASIK OU 1998       RETINAL IMAGING:    OCT Macula 12/07/22  OD: Normal foveal contour, no fluid, choroid thick, PHF attached  OS: Normal foveal contour, no fluid, choroid normal, PHF attached      optos and B scan 12/7/22  Left eye temporal minimally elevated large amelanotic choroidal nevus; no SRF       ASSESSMENT & PLAN    # Left amelanotic nevus   - minimal pigment to discern borders, but drusen in this area indicate a large basal diameter   - no elevation on exam or US   - Basal dimensions also difficult to discern on B-scan   - monitor with OCT through lesion at next visit    # ABMD OU   - follows with Navi    # Cataracts OU    -follows with Navi    # H/o silent sinus syndrome   - s/p surgery x 2   - follows with Rashaad    # dry eye each eye  - ATs prn     return to clinic: 6 mo with V/T/D with B-scan of IT nevus OS, also OCT 55 degree (MARIANA) through nevus IT OS    Chandana Padilla MD  Retina Fellow, PGY5      Complete documentation of historical and exam elements from today's encounter can be found in the full encounter summary report (not reduplicated in this progress note). I personally obtained the chief complaint(s) and history of present illness.  I confirmed and edited as necessary the review of systems, past medical/surgical history, family history, social history, and examination findings as documented by others; and I examined the patient myself. I personally reviewed the relevant tests, images, and reports as documented above. I formulated  and edited as necessary the assessment and plan and discussed the findings and management plan with the patient and family.    Rachid Mcmahon MD, PhD

## 2022-12-08 ASSESSMENT — EXTERNAL EXAM - RIGHT EYE: OD_EXAM: ENOPHTHALMOS

## 2022-12-08 ASSESSMENT — SLIT LAMP EXAM - LIDS
COMMENTS: DERMATOCHALASIS
COMMENTS: DERMATOCHALASIS

## 2022-12-08 ASSESSMENT — EXTERNAL EXAM - LEFT EYE: OS_EXAM: NORMAL

## 2022-12-19 ENCOUNTER — OFFICE VISIT (OUTPATIENT)
Dept: OPTOMETRY | Facility: CLINIC | Age: 47
End: 2022-12-19
Payer: COMMERCIAL

## 2022-12-19 ENCOUNTER — PRE VISIT (OUTPATIENT)
Dept: OTOLARYNGOLOGY | Facility: CLINIC | Age: 47
End: 2022-12-19

## 2022-12-19 DIAGNOSIS — H52.213 IRREGULAR ASTIGMATISM OF BOTH EYES: Primary | ICD-10-CM

## 2022-12-19 ASSESSMENT — REFRACTION_CURRENTRX
OS_SPHERE: -3.25
OS_DIAMETER: 14.8
OD_AXIS: 135
OS_CYLINDER: -1.50
OD_BRAND: ZENLENS RC
OD_BASECURVE: 4.1/7.5
OD_DIAMETER: 14.8
OD_CYLINDER: -1.00
OS_AXIS: 045
OS_BRAND: ZENLENS RC
OS_BASECURVE: 4.1/7.5
OS_ADDL_SPECS: BOSTON XO BLUE, HYDRAPEG
OD_SPHERE: -3.00

## 2022-12-19 ASSESSMENT — VISUAL ACUITY
OS_CC: 20/40
OD_CC: 20/20
CORRECTION_TYPE: CONTACTS
OS_SC: 20/30
METHOD: SNELLEN - LINEAR
OD_SC: 20/25-1

## 2022-12-20 ASSESSMENT — EXTERNAL EXAM - RIGHT EYE: OD_EXAM: ENOPHTHALMOS

## 2022-12-20 ASSESSMENT — EXTERNAL EXAM - LEFT EYE: OS_EXAM: NORMAL

## 2022-12-20 ASSESSMENT — SLIT LAMP EXAM - LIDS
COMMENTS: DERMATOCHALASIS
COMMENTS: DERMATOCHALASIS

## 2022-12-20 NOTE — PROGRESS NOTES
"A/P  1.) Irregular astigmatism OU  -Likely 2' to ABMD OU  -Symptomatic for blurred vision, not \"crisp\" left eye>right eye  -Scleral lens trial last exam, did appreciate increased clarity on trial frame with lenses. Required FST left eye>right eye  -Good start with Zenlens RC front surface toric. Cyl is fully corrected. Right eye 20/20. Left eye needs spherical minus only  -Largely good fit right eye, can loosen slightly and decrease vault. Left eye tighter, with rubbing nasally. Can also decrease vault  -OTC readers over    Order new pair and mail to pt (will delay a few days as he is on a trip from 12/21 through 12/28). F/u January wearing new pair if able    "

## 2022-12-23 ENCOUNTER — DOCUMENTATION ONLY (OUTPATIENT)
Dept: OPTOMETRY | Facility: CLINIC | Age: 47
End: 2022-12-23

## 2023-01-09 ENCOUNTER — TELEPHONE (OUTPATIENT)
Dept: OPTOMETRY | Facility: CLINIC | Age: 48
End: 2023-01-09

## 2023-01-11 ENCOUNTER — OFFICE VISIT (OUTPATIENT)
Dept: OPTOMETRY | Facility: CLINIC | Age: 48
End: 2023-01-11
Payer: COMMERCIAL

## 2023-01-11 DIAGNOSIS — H18.523 ANTERIOR BASEMENT MEMBRANE DYSTROPHY OF BOTH EYES: ICD-10-CM

## 2023-01-11 DIAGNOSIS — H52.213 IRREGULAR ASTIGMATISM OF BOTH EYES: Primary | ICD-10-CM

## 2023-01-11 ASSESSMENT — VISUAL ACUITY
CORRECTION_TYPE: CONTACTS
OD_CC: 20/15
OS_CC: 20/25
METHOD: SNELLEN - LINEAR

## 2023-01-11 ASSESSMENT — REFRACTION_CURRENTRX
OS_DIAMETER: 14.8
OS_BASECURVE: 3.9/7.5
OD_DIAMETER: 14.8
OS_AXIS: 045
OS_BRAND: ZENLENS RC
OS_SPHERE: -3.75
OD_BRAND: ZENLENS RC
OD_CYLINDER: -1.00
OD_AXIS: 135
OS_ADDL_SPECS: BOSTON XO BLUE, HYDRAPEG
OD_BASECURVE: 3.9/7.5
OD_SPHERE: -3.00
OS_CYLINDER: -1.50

## 2023-01-11 NOTE — PROGRESS NOTES
"Rx recheck    A/P  1.) Irregular astigmatism OU  -Likely 2' to ABMD OU  -Symptomatic for blurred vision, not \"crisp\" left eye>right eye  -Scleral lens trial last exam, did appreciate increased clarity on trial frame with lenses. Required FST left eye>right eye  -Good start with Zenlens RC front surface toric. Cyl is fully corrected. Right eye 20/20. Left eye needs spherical plus only (similar to previous)  -Slightly tight fit still OU. Flatten haptics further  -OTC readers over    Order new pair and mail to pt. Rec trying for 1-2 weeks before following up.         "

## 2023-01-12 ASSESSMENT — SLIT LAMP EXAM - LIDS
COMMENTS: DERMATOCHALASIS
COMMENTS: DERMATOCHALASIS

## 2023-01-12 ASSESSMENT — EXTERNAL EXAM - LEFT EYE: OS_EXAM: NORMAL

## 2023-01-12 ASSESSMENT — EXTERNAL EXAM - RIGHT EYE: OD_EXAM: ENOPHTHALMOS

## 2023-01-19 ENCOUNTER — TELEPHONE (OUTPATIENT)
Dept: OPTOMETRY | Facility: CLINIC | Age: 48
End: 2023-01-19

## 2023-01-19 NOTE — TELEPHONE ENCOUNTER
[facility] Call Center    Phone Message    May a detailed message be left on voicemail: yes     Reason for Call: Other: Pt has questions regarding where his contact lenses should sit in his eyeball. Please call pt to discuss. Thank you.     Action Taken: Message routed to:  [facility]    Travel Screening: Not Applicable

## 2023-01-27 ENCOUNTER — TELEPHONE (OUTPATIENT)
Dept: OPTOMETRY | Facility: CLINIC | Age: 48
End: 2023-01-27

## 2023-01-30 NOTE — TELEPHONE ENCOUNTER
[facility] Call Center    Phone Message    May a detailed message be left on voicemail: yes     Reason for Call: Other: Deyanira from [employer] regarding an appeal patient requested for contact lens. Deyanira wants to to if we have anything else we want to add besides the letter from dr Wiggins. If there is it needs to be received by 1/31 end of day. Their fax is [phone]. Thank you.     Action Taken: Message routed to:  [facility] Eye    Travel Screening: Not Applicable                                                                      
Per Dr. Wiggins    No - letter is sufficient. No extra documentation is needed     I called Pete and relayed message     Niyah Waters Communication Facilitator on 1/30/2023 at 9:04 AM    
[facility]

## 2023-02-01 ENCOUNTER — OFFICE VISIT (OUTPATIENT)
Dept: OPTOMETRY | Facility: CLINIC | Age: 48
End: 2023-02-01
Payer: COMMERCIAL

## 2023-02-01 ENCOUNTER — TELEPHONE (OUTPATIENT)
Dept: OPHTHALMOLOGY | Facility: CLINIC | Age: 48
End: 2023-02-01
Payer: COMMERCIAL

## 2023-02-01 DIAGNOSIS — H52.213 IRREGULAR ASTIGMATISM OF BOTH EYES: ICD-10-CM

## 2023-02-01 DIAGNOSIS — H18.523 ANTERIOR BASEMENT MEMBRANE DYSTROPHY OF BOTH EYES: Primary | ICD-10-CM

## 2023-02-01 ASSESSMENT — REFRACTION_CURRENTRX
OD_CYLINDER: -1.00
OS_ADDL_SPECS: BOSTON XO BLUE, HYDRAPEG
OD_DIAMETER: 14.8
OD_BRAND: ZENLENS RC
OS_CYLINDER: -1.50
OS_BRAND: ZENLENS RC
OD_AXIS: 135
OD_BASECURVE: 3.9/7.5
OS_DIAMETER: 14.8
OS_AXIS: 045
OD_SPHERE: -3.00
OS_SPHERE: -3.25
OS_BASECURVE: 3.9/7.5

## 2023-02-01 ASSESSMENT — SLIT LAMP EXAM - LIDS
COMMENTS: DERMATOCHALASIS
COMMENTS: DERMATOCHALASIS

## 2023-02-01 ASSESSMENT — VISUAL ACUITY
OS_CC: 20/15-1
CORRECTION_TYPE: CONTACTS
OD_SC: 20/30
OD_CC: 20/15-2
METHOD: SNELLEN - LINEAR

## 2023-02-01 ASSESSMENT — REFRACTION_WEARINGRX
OD_SPHERE: -0.75
OS_SPHERE: -0.75
OS_CYLINDER: +0.75
OD_CYLINDER: +0.50
OS_AXIS: 005
OD_ADD: +2.50
OS_ADD: +2.50
OD_AXIS: 140

## 2023-02-01 NOTE — TELEPHONE ENCOUNTER
Ok to schedule in return with jackie for consult only-- let pt know would not likely be performed same day.     Called and left a message     Niyah Waters Communication Facilitator on 2/1/2023 at 1:10 PM

## 2023-02-01 NOTE — PROGRESS NOTES
"Rx recheck    A/P  1.) Irregular astigmatism OU  -Likely 2' to ABMD OU  -Symptomatic for blurred vision, not \"crisp\" left eye>right eye  -Excellent objective vision with scleral lens. BCVA 20/15 both eyes  -Good fit on current lenses. No changes recommended  -Still notes \"haze\" left eye that moves with his eye and is not always in vision. Worse in dark settings/larger pupil. This sounds most like vitreous syneresis and not related to lens wear. He may be noticing it more now that vision has improved. Would rec observation only for several months to see if adaptation improves. If visually bothersome can discuss with Dr. Mcelroy but may not be candidate for vitreolysis if the floater is what is causing his symptoms (undilated today)  -Can trial monovision left eye near - sometimes goes without left lens in because he does not want to wear readers all the time    Excellent vision with current lenses. No changes on DVO pair recommended. Can trial monovision left lens to see if he tolerates.     "

## 2023-02-01 NOTE — TELEPHONE ENCOUNTER
Darlene appointment request received by triage regarding floaters and appt with Dr. Mcelroy    Reviewed with Dr. Wiggins and floaters reviewed at visit.    Pt had option to see if pt adapted to floaters vs consult for possible vitreolysis    Will reach out to pt to review options/scheduling    Mj Lyons RN 12:27 PM 02/01/23

## 2023-02-03 ENCOUNTER — TELEPHONE (OUTPATIENT)
Dept: OPTOMETRY | Facility: CLINIC | Age: 48
End: 2023-02-03

## 2023-02-03 NOTE — TELEPHONE ENCOUNTER
[facility] Call Center    Phone Message    May a detailed message be left on voicemail: no     Reason for Call: Other: Pt's insurance will like to confirm if pt's contact lens he received on 12/7 was the initial pair or a refill pair. Please reach out to Tanya and confirm. Thank You!     Action Taken: Message routed to:  [facility]    Travel Screening: Not Applicable

## 2023-03-29 ENCOUNTER — OFFICE VISIT (OUTPATIENT)
Dept: OPTOMETRY | Facility: CLINIC | Age: 48
End: 2023-03-29
Payer: COMMERCIAL

## 2023-03-29 DIAGNOSIS — H52.213 IRREGULAR ASTIGMATISM OF BOTH EYES: ICD-10-CM

## 2023-03-29 DIAGNOSIS — H04.123 DRY EYES: ICD-10-CM

## 2023-03-29 DIAGNOSIS — H18.523 ANTERIOR BASEMENT MEMBRANE DYSTROPHY OF BOTH EYES: Primary | ICD-10-CM

## 2023-03-29 ASSESSMENT — VISUAL ACUITY
VA_OR_OD_CURRENT_RX: 20/15
OD_CC: 20/15
METHOD: SNELLEN - LINEAR
CORRECTION_TYPE: CONTACTS
VA_OR_OS_CURRENT_RX: 20/20
OS_CC: 20/70+1

## 2023-03-29 ASSESSMENT — REFRACTION_CURRENTRX
OD_DIAMETER: 14.8
OS_DIAMETER: 14.8
OD_AXIS: 135
OS_SPHERE: -3.25
OS_AXIS: 045
OD_BRAND: ZENLENS RC
OS_ADDL_SPECS: BOSTON XO BLUE, HYDRAPEG
OD_SPHERE: -3.00
OD_CYLINDER: -1.00
OD_SPHERE: -3.00
OS_DIAMETER: 14.8
OS_BASECURVE: 3.9/7.5
OS_ADDL_SPECS: BOSTON XO BLUE, HYDRAPEG
OS_AXIS: 045
OD_BRAND: ZENLENS RC
OD_BASECURVE: 3.9/7.5
OS_DIAMETER: 14.8
OD_AXIS: 135
OD_SPHERE: -3.00
OS_BRAND: ZENLENS RC
OS_SPHERE: -3.25
OS_CYLINDER: -1.50
OD_AXIS: 135
OS_CYLINDER: -1.50
OD_DIAMETER: 14.8
OD_BRAND: ZENLENS RC
OD_CYLINDER: -1.00
OS_ADDL_SPECS: BOSTON XO BLUE, HYDRAPEG
OS_BASECURVE: 3.9/7.5
OD_CYLINDER: -1.00
OS_AXIS: 045
OS_CYLINDER: -1.50
OS_SPHERE: -2.00
OS_BRAND: ZENLENS RC
OD_BASECURVE: 3.9/7.5
OD_BASECURVE: 3.9/7.5
OS_BASECURVE: 3.9/7.5
OD_DIAMETER: 14.8

## 2023-03-29 ASSESSMENT — TONOMETRY
IOP_METHOD: ICARE
OD_IOP_MMHG: 21
OS_IOP_MMHG: 20

## 2023-03-29 ASSESSMENT — SLIT LAMP EXAM - LIDS
COMMENTS: DERMATOCHALASIS
COMMENTS: DERMATOCHALASIS

## 2023-03-29 NOTE — PATIENT INSTRUCTIONS
Artificial tears: (Okay to use with contact lenses in)  -Refresh Plus  -Refresh Relieva  -Systane Ultra  -Systane Hydration  -Blink Tears  (Notes: Anything in a bottle has preservatives and can be used up to 4x/day. Preservative free vials can be used as much as necessary)

## 2023-03-30 NOTE — PROGRESS NOTES
"A/P  1.) Irregular astigmatism OU  -Likely 2' to ABMD OU  -Symptomatic for blurred vision, not \"crisp\" left eye>right eye  -Excellent objective vision with scleral lens. BCVA 20/15 both eyes  -Likes monovision left eye near setup. Right lens broke, wearing older tighter one  -Would increase limbal clearance 360 OU. Otherwise fitting well. Would like to increase diam over time but he prefers not to due to insertion difficulty    2.) Dry Eyes OU  -Symptomatic for fluctuating vision. Has not done much therapy to date beyond AT  -Consider Restasis vs plugs. He prefers plugs OU    Order new pair and mail direct    I have confirmed the patient's CC, HPI and reviewed Past Medical History, Past Surgical History, Social History, Family History, Problem List, Medication List and agree with Tech note.     Li Wiggins, OD FAAO FSLS    "

## 2023-04-15 ENCOUNTER — HEALTH MAINTENANCE LETTER (OUTPATIENT)
Age: 48
End: 2023-04-15

## 2023-04-18 NOTE — TELEPHONE ENCOUNTER
FUTURE VISIT INFORMATION      FUTURE VISIT INFORMATION:    Date: 5/12/23    Time: 10:30am    Location: [facility]  REFERRAL INFORMATION:    Referring provider:  Dr Ina Weems     Referring providers clinic:  [facility]    Reason for visit/diagnosis  J32.0 (ICD-10-CM) - Chronic maxillary sinusitis, referred by Nevin Kat, Referral notes in EPIC. Pt made appt for [facility] location    RECORDS REQUESTED FROM:       Clinic name Comments Records Status Imaging Status   [facility]   Dr Ina Weems  4/18/23- Pending   4/24/23- received records    [facility] 11/21/22- note with Louisa Jiménez MD Epic     Imaging  10/31/22- CT Orbital  [facility]  PACS    [facility] 11/11/21- CT sinus  Scanned in epic  PACS                 April 18, 2023 10:49 AM - Faxed a request to [facility] for records to be sent to us- Danita   April 19, 2023 11:54 AM- received records from [facility] and sent it to atul - Danita

## 2023-05-03 DIAGNOSIS — H18.523 ANTERIOR BASEMENT MEMBRANE DYSTROPHY OF BOTH EYES: ICD-10-CM

## 2023-05-03 DIAGNOSIS — H04.123 DRY EYES: Primary | ICD-10-CM

## 2023-05-03 DIAGNOSIS — H52.213 IRREGULAR ASTIGMATISM OF BOTH EYES: ICD-10-CM

## 2023-05-03 NOTE — TELEPHONE ENCOUNTER
[facility] Call Center    Phone Message    May a detailed message be left on voicemail: yes     Reason for Call: Medication Refill Request    Has the patient contacted the pharmacy for the refill? Yes   Name of medication being requested: Preservative-free saline solution.   Provider who prescribed the medication: Li Wiggins OD  Pharmacy: [facility] [address]  Date medication is needed: asap.      Action Taken: Message routed to:  [facility] Opt[facility]    Travel Screening: Not Applicable

## 2023-05-04 RX ORDER — SODIUM CHLORIDE FOR INHALATION 0.9 %
3 VIAL, NEBULIZER (ML) INHALATION 2 TIMES DAILY
Qty: 300 ML | Refills: 4 | Status: SHIPPED | OUTPATIENT
Start: 2023-05-04 | End: 2023-08-01

## 2023-05-04 NOTE — TELEPHONE ENCOUNTER
"Preservative-free saline solution  Last Written Prescription Date:  ?  Last Fill Quantity: ?,   # refills: ?  Last Office Visit :  3/29/2023  Future Office visit:  5/23/2023   Li Wiggins, JEREMÍAS  Optometry     A/P  1.) Irregular astigmatism OU  -Likely 2' to ABMD OU  -Symptomatic for blurred vision, not \"crisp\" left eye>right eye  -Excellent objective vision with scleral lens. BCVA 20/15 both eyes  -Likes monovision left eye near setup. Right lens broke, wearing older tighter one  -Would increase limbal clearance 360 OU. Otherwise fitting well. Would like to increase diam over time but he prefers not to due to insertion difficulty     2.) Dry Eyes OU  -Symptomatic for fluctuating vision. Has not done much therapy to date beyond AT  -Consider Restasis vs plugs. He prefers plugs OU     Order new pair and mail direct     I have confirmed the patient's CC, HPI and reviewed Past Medical History, Past Surgical History, Social History, Family History, Problem List, Medication List and agree with Tech note.      Li Wiggins, JEREMÍAS FAAO FSLS  Routing refill request to provider for review/approval because:  Med not on med list.       Sharmila Sung RN  Central Triage Red Flags/Med Refills    "

## 2023-05-12 ENCOUNTER — OFFICE VISIT (OUTPATIENT)
Dept: OTOLARYNGOLOGY | Facility: CLINIC | Age: 48
End: 2023-05-12
Payer: COMMERCIAL

## 2023-05-12 ENCOUNTER — PRE VISIT (OUTPATIENT)
Dept: OTOLARYNGOLOGY | Facility: CLINIC | Age: 48
End: 2023-05-12

## 2023-05-12 VITALS — WEIGHT: 245 LBS | BODY MASS INDEX: 35.07 KG/M2 | HEIGHT: 70 IN

## 2023-05-12 DIAGNOSIS — R09.82 POST-NASAL DRIP: ICD-10-CM

## 2023-05-12 DIAGNOSIS — J32.0 CHRONIC MAXILLARY SINUSITIS: Primary | ICD-10-CM

## 2023-05-12 DIAGNOSIS — E66.01 MORBID OBESITY (H): ICD-10-CM

## 2023-05-12 DIAGNOSIS — J34.89 SILENT SINUS SYNDROME: ICD-10-CM

## 2023-05-12 PROCEDURE — 31231 NASAL ENDOSCOPY DX: CPT | Performed by: OTOLARYNGOLOGY

## 2023-05-12 PROCEDURE — 99214 OFFICE O/P EST MOD 30 MIN: CPT | Mod: 25 | Performed by: OTOLARYNGOLOGY

## 2023-05-12 RX ORDER — IPRATROPIUM BROMIDE 42 UG/1
2 SPRAY, METERED NASAL 3 TIMES DAILY
Qty: 15 ML | Refills: 3 | Status: SHIPPED | OUTPATIENT
Start: 2023-05-12

## 2023-05-12 ASSESSMENT — PAIN SCALES - GENERAL: PAINLEVEL: MODERATE PAIN (4)

## 2023-05-12 NOTE — LETTER
5/12/2023       RE: Iraj Ghosh  [address] [place] [address]     Dear Colleague,    Thank you for referring your patient, Iraj Ghosh, to the [facility] [place] at [facility]. Please see a copy of my visit note below.      [facility]  New Patient Visit      Encounter date:   May 12, 2023    Referring Provider:   No referring provider defined for this encounter.    Chief Complaint: Consult    History of Present Illness:   Iraj Ghosh is a 48 year old male who presents for consultation regarding silent sinus syndrome. She has seen Dr. Jiménez in the past and was started on irrigations and nasal steroid spray. He has had drainage form the nose that will become green every 2-3 weeks. He rinses with saline. He was previously treated with Augmentin for recent infection. He has been spitting up mucus after treatment with continued congestion.     Sino-Nasal Outcome Test (SNOT - 22)  1. Need to Blow Nose: (P) Moderate  2. Nasal Blockage: (P) Moderate  3. Sneezing: (P) Very mild  4. Runny Nose: (P) Very mild  5. Cough: (P) Very mild  6. Post-nasal discharge: (P) Severe  7. Thick nasal discharge: (P) Moderate  8. Ear fullness: (P) None  9. Dizziness: (P) None  10. Ear Pain: (P) None  11. Facial pain/pressure: (P) Moderate  12. Decreased Sense of Smell/Taste: (P) None  13. Difficulty falling asleep: (P) None  14. Wake up at night: (P) Mild or slight  15. Lack of a good night's sleep: (P) Severe  16. Wake up tired: (P) Severe  17. Fatigue: (P) Severe  18. Reduced Productivity: (P) Moderate  19. Reduced Concentration: (P) Mild or slight  20. Frustrated/restless/irritable: (P) Moderate  21. Sad: (P) None  22. Embarrassed: (P) None  Total Score: (P) 41    [place] Oper[facility]:  Surgery with Dr. Grady.     Review of systems: A 14-point review of systems has been conducted and was negative for any notable symptoms,  except as dictated in the history of present illness.     Medical History:  Past Medical History:   Diagnosis Date    Allergic rhinitis, cause unspecified     Anxiety     Depressive disorder     Diaphragmatic hernia without mention of obstruction or gangrene     Gastro-oesophageal reflux disease     Perforated abdominal viscus 3/10/2015    Reflux esophagitis     Rosacea     Sepsis, unspecified 3/19/2015    Sepsis due to perforated diverticulitis with abscess.     Stricture and stenosis of esophagus         Surgical History:   Past Surgical History:   Procedure Laterality Date    COLECTOMY WITH COLOSTOMY, COMBINED N/A 03/09/2015    Procedure: COMBINED COLECTOMY WITH COLOSTOMY;  Surgeon: Caroline Chong MD;  Location: [facility]    CYSTOSCOPY N/A 06/03/2015    Procedure: CYSTOSCOPY;  Surgeon: Alverto Ribeiro MD;  Location: [facility] OR    INSERT STENT URETER Left 06/03/2015    Procedure: INSERT STENT URETER (PRE-OP);  Surgeon: Alverto Ribeiro MD;  Location: [facility] OR    LAPAROSCOPIC ASSISTED COLOSTOMY TAKEDOWN N/A 06/03/2015    Procedure: LAPAROSCOPIC ASSISTED COLOSTOMY TAKEDOWN;  Surgeon: Caroline Chong MD;  Location: [facility] OR    LAPAROTOMY EXPLORATORY N/A 03/09/2015    Procedure: LAPAROTOMY EXPLORATORY;  Surgeon: Caroline Chong MD;  Location: [facility] OR    [address] NONSPECIFIC PROCEDURE      tons        Family History:  Family History   Problem Relation Age of Onset    Diabetes Mother         gestational    Cancer Mother         Ovarian    Thyroid Disease Mother     Coronary Artery Disease Father     Glaucoma Maternal Grandmother     Hypertension Maternal Grandmother     Cancer Maternal Grandmother     Diabetes Maternal Grandmother     Cancer Maternal Grandfather         Prostate    Heart Disease Maternal Grandfather     Cerebrovascular Disease Maternal Grandfather     Alzheimer Disease Maternal Grandfather     Diabetes Maternal Grandfather     Glaucoma Maternal Grandfather     Hypertension  "Maternal Grandfather     Macular Degeneration Maternal Grandfather     Thyroid Disease Sister     Glasses (<9 y/o) Sister     Macular Degeneration Other     Hypertension Other         Social History:   Social History     Socioeconomic History    Marital status: Single   Tobacco Use    Smoking status: Former     Types: Dip, chew, snus or snuff    Smokeless tobacco: Former     Types: Chew   Substance and Sexual Activity    Alcohol use: Not Currently     Comment: 12 weekly    Drug use: Not Currently     Types: Marijuana    Sexual activity: Yes     Partners: Male     Birth control/protection: Condom, None   Other Topics Concern    Parent/sibling w/ CABG, MI or angioplasty before 65F 55M? Yes     Social Determinants of Health     Intimate Partner Violence: Not At Risk (12/7/2022)    Humiliation, Afraid, Rape, and Kick questionnaire     Fear of Current or Ex-Partner: No     Emotionally Abused: No     Physically Abused: No     Sexually Abused: No        Physical Exam:  Vital signs: Ht 1.778 m (5' 10\")   Wt 111.1 kg (245 lb)   BMI 35.15 kg/m     General Appearance: No acute distress, appropriate demeanor, conversant  Eyes: moist conjunctivae; EOMI; pupils symmetric; visual acuity grossly intact; no proptosis  Head: normocephalic; overall symmetric appearance without deformity  Face: overall symmetric without deformity; HB I/VI  Ears: Normal appearance of external ear; external meatus normal in appearance; TMs intact without perforation bilaterally;   Nose: No external deformity; see endoscopy   Oral Cavity/oropharynx: Normal appearance of mucosa; tongue midline; no mass or lesions; oropharynx without obvious mucosal abnormality  Neck: no palpable lymphadenopathy; thyroid without palpable nodules  Lungs: symmetric chest rise; no wheezing  CV: Good distal perfusion; normal hear rate  Extremities: No deformity  Neurologic Exam: Cranial nerves II-XII are grossly intact; no focal deficit    Procedure Note  Procedure performed: " Rigid nasal endoscopy  Indication: To evaluate for sinonasal pathology not visualized on routine anterior rhinoscopy  Anesthesia: 4% topical lidocaine with 0.05% oxymetazoline  Description of procedure: A 30 degree, 3 mm rigid endoscope was inserted into bilateral nasal cavities and the nasal valves, nasal cavity, middle meatus, sphenoethmoid recess, and nasopharynx were thoroughly evaluated for evidence of obstruction, edema, purulence, polyps and/or mass/lesion.     Marky-Francisco Endoscopic Scoring System  Endoscopic observation Right Left   Polyps in middle meatus (0 = absent, 1 = restricted to middle meatus, 2 = Beyond middle meatus) 0 0   Discharge (0 = absent, 1 = thin and clear, 2 = thick, purulent) 1 1   Edema (0 = absent, 1 = mild-moderate, 2 = moderate-severe) 0 0   Crusting (0 = absent, 1 = mild-moderate, 2 = moderate-severe) 0 0   Scarring (0= absent, 1 = mild-moderate, 2 = moderate-severe) 0 0   Total 1 1     Findings  RT: evidence of blanca antrostomy; no evidence of severe infection or rhinolith  LT: perforation of left IT; evidence of prior sinus surgery; no evidence of inflammation or infection    The patient tolerated the procedure well without complication.     Laboratory Review:  n/a    Imaging Review:  n/a    Pathology Review:  n/a    Assessment/Medical Decision Making:  Silent sinus syndrome  S/p ESS in the past, x2   IT perforation  Atypical facial pain    Plan:  Bilateral endoscopy performed shows no evidence of infection or inflammation today. He is now past treatment and the sinuses are likely in best condition. For maintenance and mucus suppression he should continue budesonide rinses and start Atrovent. Follow-up in 3 months or sooner if symptoms worsen.      Andrew Anaya MD    [facility]  Rhinology  Endoscopic Skull Base Surgery  [facility]  Department of Otolaryngology - Head & Neck Surgery    Scribe Disclosure:  Hiren ACE am  serving as a scribe to document services personally performed by Andrew Anaya MD at this visit, based upon the provider's statements to me. All documentation has been reviewed by the aforementioned provider prior to being entered into the official medical record.        Again, thank you for allowing me to participate in the care of your patient.      Sincerely,    Andrew Anaya MD

## 2023-05-12 NOTE — PATIENT INSTRUCTIONS
1. Please follow-up in clinic in 3 months   Continue budesonide and add Atrovent nasal spray  2. Please call the ENT clinic with any questions,concerns, new or worsening symptoms.    -Clinic number is [phone]   - Shahida's direct line (Dr. Anaya's nurse) [phone]

## 2023-05-12 NOTE — PROGRESS NOTES
[facility]  New Patient Visit      Encounter date:   May 12, 2023    Referring Provider:   No referring provider defined for this encounter.    Chief Complaint: Consult    History of Present Illness:   Iraj Ghosh is a 48 year old male who presents for consultation regarding silent sinus syndrome. She has seen Dr. Jiménez in the past and was started on irrigations and nasal steroid spray. He has had drainage form the nose that will become green every 2-3 weeks. He rinses with saline. He was previously treated with Augmentin for recent infection. He has been spitting up mucus after treatment with continued congestion.     Sino-Nasal Outcome Test (SNOT - 22)  1. Need to Blow Nose: (P) Moderate  2. Nasal Blockage: (P) Moderate  3. Sneezing: (P) Very mild  4. Runny Nose: (P) Very mild  5. Cough: (P) Very mild  6. Post-nasal discharge: (P) Severe  7. Thick nasal discharge: (P) Moderate  8. Ear fullness: (P) None  9. Dizziness: (P) None  10. Ear Pain: (P) None  11. Facial pain/pressure: (P) Moderate  12. Decreased Sense of Smell/Taste: (P) None  13. Difficulty falling asleep: (P) None  14. Wake up at night: (P) Mild or slight  15. Lack of a good night's sleep: (P) Severe  16. Wake up tired: (P) Severe  17. Fatigue: (P) Severe  18. Reduced Productivity: (P) Moderate  19. Reduced Concentration: (P) Mild or slight  20. Frustrated/restless/irritable: (P) Moderate  21. Sad: (P) None  22. Embarrassed: (P) None  Total Score: (P) 41    [place] Operative History:  Surgery with Dr. Grady.     Review of systems: A 14-point review of systems has been conducted and was negative for any notable symptoms, except as dictated in the history of present illness.     Medical History:  Past Medical History:   Diagnosis Date     Allergic rhinitis, cause unspecified      Anxiety      Depressive disorder      Diaphragmatic hernia without mention of obstruction or gangrene      Gastro-oesophageal reflux disease      Perforated  abdominal viscus 3/10/2015     Reflux esophagitis      Rosacea      Sepsis, unspecified 3/19/2015    Sepsis due to perforated diverticulitis with abscess.      Stricture and stenosis of esophagus         Surgical History:   Past Surgical History:   Procedure Laterality Date     COLECTOMY WITH COLOSTOMY, COMBINED N/A 03/09/2015    Procedure: COMBINED COLECTOMY WITH COLOSTOMY;  Surgeon: Caroline Chong MD;  Location: [facility] OR     CYSTOSCOPY N/A 06/03/2015    Procedure: CYSTOSCOPY;  Surgeon: Alverto Ribeiro MD;  Location: [facility] OR     INSERT STENT URETER Left 06/03/2015    Procedure: INSERT STENT URETER (PRE-OP);  Surgeon: Alverto Ribeiro MD;  Location: [facility] OR     LAPAROSCOPIC ASSISTED COLOSTOMY TAKEDOWN N/A 06/03/2015    Procedure: LAPAROSCOPIC ASSISTED COLOSTOMY TAKEDOWN;  Surgeon: Caroline Chong MD;  Location: [facility] OR     LAPAROTOMY EXPLORATORY N/A 03/09/2015    Procedure: LAPAROTOMY EXPLORATORY;  Surgeon: Caroline Chong MD;  Location:  OR     LASIK  1998     ZZC NONSPECIFIC PROCEDURE      tons        Family History:  Family History   Problem Relation Age of Onset     Diabetes Mother         gestational     Cancer Mother         Ovarian     Thyroid Disease Mother      Coronary Artery Disease Father      Glaucoma Maternal Grandmother      Hypertension Maternal Grandmother      Cancer Maternal Grandmother      Diabetes Maternal Grandmother      Cancer Maternal Grandfather         Prostate     Heart Disease Maternal Grandfather      Cerebrovascular Disease Maternal Grandfather      Alzheimer Disease Maternal Grandfather      Diabetes Maternal Grandfather      Glaucoma Maternal Grandfather      Hypertension Maternal Grandfather      Macular Degeneration Maternal Grandfather      Thyroid Disease Sister      Glasses (<7 y/o) Sister      Macular Degeneration Other      Hypertension Other         Social History:   Social History     Socioeconomic History     Marital status: Single   Tobacco Use      "Smoking status: Former     Types: Dip, chew, snus or snuff     Smokeless tobacco: Former     Types: Chew   Substance and Sexual Activity     Alcohol use: Not Currently     Comment: 12 weekly     Drug use: Not Currently     Types: Marijuana     Sexual activity: Yes     Partners: Male     Birth control/protection: Condom, None   Other Topics Concern     Parent/sibling w/ CABG, MI or angioplasty before 65F 55M? Yes     Social Determinants of Health     Intimate Partner Violence: Not At Risk (12/7/2022)    Humiliation, Afraid, Rape, and Kick questionnaire      Fear of Current or Ex-Partner: No      Emotionally Abused: No      Physically Abused: No      Sexually Abused: No        Physical Exam:  Vital signs: Ht 1.778 m (5' 10\")   Wt 111.1 kg (245 lb)   BMI 35.15 kg/m     General Appearance: No acute distress, appropriate demeanor, conversant  Eyes: moist conjunctivae; EOMI; pupils symmetric; visual acuity grossly intact; no proptosis  Head: normocephalic; overall symmetric appearance without deformity  Face: overall symmetric without deformity; HB I/VI  Ears: Normal appearance of external ear; external meatus normal in appearance; TMs intact without perforation bilaterally;   Nose: No external deformity; see endoscopy   Oral Cavity/oropharynx: Normal appearance of mucosa; tongue midline; no mass or lesions; oropharynx without obvious mucosal abnormality  Neck: no palpable lymphadenopathy; thyroid without palpable nodules  Lungs: symmetric chest rise; no wheezing  CV: Good distal perfusion; normal hear rate  Extremities: No deformity  Neurologic Exam: Cranial nerves II-XII are grossly intact; no focal deficit    Procedure Note  Procedure performed: Rigid nasal endoscopy  Indication: To evaluate for sinonasal pathology not visualized on routine anterior rhinoscopy  Anesthesia: 4% topical lidocaine with 0.05% oxymetazoline  Description of procedure: A 30 degree, 3 mm rigid endoscope was inserted into bilateral nasal " cavities and the nasal valves, nasal cavity, middle meatus, sphenoethmoid recess, and nasopharynx were thoroughly evaluated for evidence of obstruction, edema, purulence, polyps and/or mass/lesion.     Marky-Francisco Endoscopic Scoring System  Endoscopic observation Right Left   Polyps in middle meatus (0 = absent, 1 = restricted to middle meatus, 2 = Beyond middle meatus) 0 0   Discharge (0 = absent, 1 = thin and clear, 2 = thick, purulent) 1 1   Edema (0 = absent, 1 = mild-moderate, 2 = moderate-severe) 0 0   Crusting (0 = absent, 1 = mild-moderate, 2 = moderate-severe) 0 0   Scarring (0= absent, 1 = mild-moderate, 2 = moderate-severe) 0 0   Total 1 1     Findings  RT: evidence of blanca antrostomy; no evidence of severe infection or rhinolith  LT: perforation of left IT; evidence of prior sinus surgery; no evidence of inflammation or infection    The patient tolerated the procedure well without complication.     Laboratory Review:  n/a    Imaging Review:  n/a    Pathology Review:  n/a    Assessment/Medical Decision Making:  Silent sinus syndrome  S/p ESS in the past, x2   IT perforation  Atypical facial pain    Plan:  Bilateral endoscopy performed shows no evidence of infection or inflammation today. He is now past treatment and the sinuses are likely in best condition. For maintenance and mucus suppression he should continue budesonide rinses and start Atrovent. Follow-up in 3 months or sooner if symptoms worsen.      Anrdew Anaya MD    [facility]  Rhinology  Endoscopic Skull Base Surgery  [facility]  Department of Otolaryngology - Head & Neck Surgery    Scribe Disclosure:  I, Hiren Sparks, am serving as a scribe to document services personally performed by Andrew Anaya MD at this visit, based upon the provider's statements to me. All documentation has been reviewed by the aforementioned provider prior to being entered into the official medical record.

## 2023-05-12 NOTE — NURSING NOTE
"Chief Complaint   Patient presents with     Consult    Height 1.778 m (5' 10\"), weight 111.1 kg (245 lb). Clarita Sandhu LPN    "

## 2023-05-21 PROBLEM — E66.01 MORBID OBESITY (H): Status: ACTIVE | Noted: 2023-05-21

## 2023-05-23 ENCOUNTER — OFFICE VISIT (OUTPATIENT)
Dept: OPHTHALMOLOGY | Facility: CLINIC | Age: 48
End: 2023-05-23
Attending: OPHTHALMOLOGY
Payer: COMMERCIAL

## 2023-05-23 DIAGNOSIS — H18.523 ANTERIOR BASEMENT MEMBRANE DYSTROPHY (ABMD) OF BOTH EYES: ICD-10-CM

## 2023-05-23 DIAGNOSIS — D31.32 CHOROIDAL NEVUS OF LEFT EYE: Primary | ICD-10-CM

## 2023-05-23 DIAGNOSIS — H53.9 VISUAL DISTURBANCE: Primary | ICD-10-CM

## 2023-05-23 DIAGNOSIS — Z98.890 H/O LASER ASSISTED IN SITU KERATOMILEUSIS: ICD-10-CM

## 2023-05-23 DIAGNOSIS — H52.213 IRREGULAR ASTIGMATISM OF BOTH EYES: ICD-10-CM

## 2023-05-23 PROCEDURE — 92014 COMPRE OPH EXAM EST PT 1/>: CPT | Mod: GC | Performed by: OPHTHALMOLOGY

## 2023-05-23 PROCEDURE — G0463 HOSPITAL OUTPT CLINIC VISIT: HCPCS | Performed by: OPHTHALMOLOGY

## 2023-05-23 ASSESSMENT — CONF VISUAL FIELD
OD_SUPERIOR_NASAL_RESTRICTION: 0
OS_INFERIOR_TEMPORAL_RESTRICTION: 0
OD_INFERIOR_NASAL_RESTRICTION: 0
METHOD: COUNTING FINGERS
OD_SUPERIOR_TEMPORAL_RESTRICTION: 0
OS_INFERIOR_NASAL_RESTRICTION: 0
OS_NORMAL: 1
OS_SUPERIOR_NASAL_RESTRICTION: 0
OS_SUPERIOR_TEMPORAL_RESTRICTION: 0
OD_NORMAL: 1
OD_INFERIOR_TEMPORAL_RESTRICTION: 0

## 2023-05-23 ASSESSMENT — VISUAL ACUITY
CORRECTION_TYPE: CONTACTS
OS_CC+: -2
OD_CC: 20/15
METHOD: SNELLEN - LINEAR
OS_CC: 20/40

## 2023-05-23 ASSESSMENT — TONOMETRY
OS_IOP_MMHG: 18
OD_IOP_MMHG: 15
IOP_METHOD: TONOPEN

## 2023-05-23 ASSESSMENT — SLIT LAMP EXAM - LIDS
COMMENTS: DERMATOCHALASIS
COMMENTS: DERMATOCHALASIS

## 2023-05-23 ASSESSMENT — CUP TO DISC RATIO
OS_RATIO: 0.3
OD_RATIO: 0.3

## 2023-05-23 NOTE — PROGRESS NOTES
"Chief Complaint(s) and History of Present Illness(es)     Consult For            Associated symptoms: floaters.  Negative for eye pain and flashes    Treatments tried: artificial tears    Comments: Consult for possible vitreolysis          Comments    4 months ago floaters started in left eye.   Describes it hazy like \"looking through the bottom of a drinking glass\"  No flashes of light. No eye pain today.  Uses Systane PRN for dryness both eyes    Peyman Ho 2:12 PM May 23, 2023        Referral from Dr. Wiggins for peripheral haziness.    Patient describes peripheral hazy vision like fogged glasses in his peripheral vision whenever he is wearing his scleral lenses (irregular astigmatism post lasik). When he does not wear his lenses he does not notice this.   The hazy region is usually temporal periphery, but it can be in other places. Does not seem to move with eye movement though he is unsure.     Review of systems for the eyes was negative other than the pertinent positives/negatives listed in the HPI.      Assessment & Plan      Iraj Ghosh is a 48 year old male with the following diagnoses:   1. Visual disturbance - Left Eye    2. H/O laser assisted in situ keratomileusis - Both Eyes    3. 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 Irregular astigmatism of both eyes         No PVD on exam. Unlikely symptoms are from vitreous  Discussed symptoms of retinal tear/detachment and the need to be seen urgently should they occur   Suspicious for lens being culprit given only present when wearing the lens  Continue to manage scleral lenses with Dr. MILLER.  Recommend obtaining back up glasses  Could consider cornea consult to discuss ablative options if contact lenses continue to be difficult with I&Rs          Patient disposition:   Return if symptoms worsen or fail to improve.    Chuyita Neal MD  Ophthalmology Resident PGY4  [facility]            Attending Physician Attestation:  " Complete documentation of historical and exam elements from today's encounter can be found in the full encounter summary report (not reduplicated in this progress note).  I personally obtained the chief complaint(s) and history of present illness.  I confirmed and edited as necessary the review of systems, past medical/surgical history, family history, social history, and examination findings as documented by others; and I examined the patient myself.  I personally reviewed the relevant tests, images, and reports as documented above.  I formulated and edited as necessary the assessment and plan and discussed the findings and management plan with the patient and family. . - Alexander Mcelroy MD

## 2023-05-23 NOTE — NURSING NOTE
"Chief Complaints and History of Present Illnesses   Patient presents with     Consult For     Consult for possible vitreolysis     Chief Complaint(s) and History of Present Illness(es)     Consult For            Associated symptoms: floaters.  Negative for eye pain and flashes    Treatments tried: artificial tears    Comments: Consult for possible vitreolysis          Comments    4 months ago floaters started in left eye.   Describes it hazy like \"looking through the bottom of a drinking glass\"  No flashes of light. No eye pain today.  Uses Systane PRN for dryness both eyes    Peyman Ho 2:12 PM May 23, 2023                    "

## 2023-06-07 ENCOUNTER — OFFICE VISIT (OUTPATIENT)
Dept: OPHTHALMOLOGY | Facility: CLINIC | Age: 48
End: 2023-06-07
Attending: OPHTHALMOLOGY
Payer: COMMERCIAL

## 2023-06-07 DIAGNOSIS — H25.13 NUCLEAR SENILE CATARACT OF BOTH EYES: ICD-10-CM

## 2023-06-07 DIAGNOSIS — H04.129 DRY EYE AFTER LASER IN SITU KERATOMILEUSIS: ICD-10-CM
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
## 2023-06-07 DIAGNOSIS — H59.89 DRY EYE AFTER LASER IN SITU KERATOMILEUSIS: ICD-10-CM

## 2023-06-07 DIAGNOSIS — D31.32 CHOROIDAL NEVUS OF LEFT EYE: Primary | ICD-10-CM

## 2023-06-07 PROCEDURE — 92134 CPTRZ OPH DX IMG PST SGM RTA: CPT | Performed by: OPHTHALMOLOGY

## 2023-06-07 PROCEDURE — 92015 DETERMINE REFRACTIVE STATE: CPT

## 2023-06-07 PROCEDURE — 99207 FUNDUS PHOTOS OU (BOTH EYES): CPT | Mod: 26 | Performed by: OPHTHALMOLOGY

## 2023-06-07 PROCEDURE — 76512 OPH US DX B-SCAN: CPT | Performed by: OPHTHALMOLOGY

## 2023-06-07 PROCEDURE — G0463 HOSPITAL OUTPT CLINIC VISIT: HCPCS | Performed by: OPHTHALMOLOGY

## 2023-06-07 PROCEDURE — 92014 COMPRE OPH EXAM EST PT 1/>: CPT | Performed by: OPHTHALMOLOGY

## 2023-06-07 PROCEDURE — 92250 FUNDUS PHOTOGRAPHY W/I&R: CPT | Performed by: OPHTHALMOLOGY

## 2023-06-07 ASSESSMENT — CONF VISUAL FIELD
METHOD: COUNTING FINGERS
OS_INFERIOR_TEMPORAL_RESTRICTION: 0
OS_SUPERIOR_TEMPORAL_RESTRICTION: 0
OD_INFERIOR_TEMPORAL_RESTRICTION: 0
OD_INFERIOR_NASAL_RESTRICTION: 0
OD_NORMAL: 1
OD_SUPERIOR_NASAL_RESTRICTION: 0
OS_INFERIOR_NASAL_RESTRICTION: 0
OD_SUPERIOR_TEMPORAL_RESTRICTION: 0
OS_SUPERIOR_NASAL_RESTRICTION: 0
OS_NORMAL: 1

## 2023-06-07 ASSESSMENT — VISUAL ACUITY
OS_SC: 20/25
OD_SC: 20/40
METHOD: SNELLEN - LINEAR

## 2023-06-07 ASSESSMENT — REFRACTION_MANIFEST
OS_AXIS: 010
OD_CYLINDER: +0.50
OS_SPHERE: -0.75
OD_SPHERE: -1.00
OS_ADD: +1.50
OD_ADD: +1.50
OD_AXIS: 145
OS_CYLINDER: +0.75

## 2023-06-07 ASSESSMENT — EXTERNAL EXAM - RIGHT EYE: OD_EXAM: ENOPHTHALMOS

## 2023-06-07 ASSESSMENT — TONOMETRY
OD_IOP_MMHG: 18
OS_IOP_MMHG: 19
IOP_METHOD: TONOPEN

## 2023-06-07 ASSESSMENT — CUP TO DISC RATIO
OS_RATIO: 0.5
OD_RATIO: 0.5

## 2023-06-07 ASSESSMENT — SLIT LAMP EXAM - LIDS
COMMENTS: DERMATOCHALASIS
COMMENTS: DERMATOCHALASIS

## 2023-06-07 ASSESSMENT — EXTERNAL EXAM - LEFT EYE: OS_EXAM: NORMAL

## 2023-06-07 NOTE — NURSING NOTE
Chief Complaints and History of Present Illnesses   Patient presents with     Follow Up     Choroidal nevus left eye.      Chief Complaint(s) and History of Present Illness(es)     Follow Up            Laterality: left eye    Associated symptoms: Negative for eye pain, flashes and floaters    Treatments tried: artificial tears    Pain scale: 0/10    Comments: Choroidal nevus left eye.          Comments    Eye meds: systane preservative free    EFREN Hare 6/7/2023 8:25 AM

## 2023-06-07 NOTE — PROGRESS NOTES
CC -   Evaluation of nevus, OS    INTERVAL HISTORY - Adrian change in vision; no new symptoms    PMH -   Iraj Ghosh is a 48 year old male here for evaluation of nevus of the left eye. He was referred by Dr. 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       PAST MEDICAL HISTORY:  Silent sinus syndrome (follows with Jamel)    PAST OCULAR SURGERY:  Silent sinus syndrome surgery (right) 4/2017 (in [place])  Multiple sinus surgery (bilateral) - 12/2021 ([facility])  Myopic LASIK OU 1998       RETINAL IMAGING:    OCT Macula 12/07/22 and 6/7/23  OD: Normal foveal contour, no fluid, choroid thick, PHF attached  OS: Normal foveal contour, no fluid, choroid normal, PHF attached      optos and B scan 12/7/22 and 6/7/23  Left eye temporal minimally elevated large amelanotic choroidal nevus; no SRF; stable       ASSESSMENT & PLAN    # Left amelanotic nevus   - minimal pigment to discern borders, but drusen in this area indicate a large basal diameter   - no elevation on exam or US   - Basal dimensions also difficult to discern on B-scan   - monitor with OCT through lesion at next visit    # Cataracts OU    -follows with Navi    # H/o silent sinus syndrome   - s/p surgery x 2   - follows with Rashaad    # dry eye each eye  - ATs prn     return to clinic: 12 mo with V/T/D with B-scan of IT nevus OS, also OCT 55 degree (MARIANA) through nevus IT OS      Complete documentation of historical and exam elements from today's encounter can be found in the full encounter summary report (not reduplicated in this progress note). I personally obtained the chief complaint(s) and history of present illness.  I confirmed and edited as necessary the review of systems, past medical/surgical history, family history, social history, and examination findings as documented by others; and I examined the patient myself. I personally reviewed the relevant tests, images, and reports as documented above. I formulated and edited as necessary the  assessment and plan and discussed the findings and management plan with the patient and family.    Racihd Mcmahon MD, PhD           DISCHARGE

## 2023-06-23 ENCOUNTER — OFFICE VISIT (OUTPATIENT)
Dept: OPHTHALMOLOGY | Facility: CLINIC | Age: 48
End: 2023-06-23
Attending: OPHTHALMOLOGY
Payer: COMMERCIAL

## 2023-06-23 DIAGNOSIS — Z98.890 H/O LASER ASSISTED IN SITU KERATOMILEUSIS: Primary | ICD-10-CM

## 2023-06-23 DIAGNOSIS — D31.32 CHOROIDAL NEVUS OF LEFT EYE: ICD-10-CM

## 2023-06-23 DIAGNOSIS — H04.129 DRY EYE AFTER LASER IN SITU KERATOMILEUSIS: ICD-10-CM
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
## 2023-06-23 DIAGNOSIS — H40.003 GLAUCOMA SUSPECT OF BOTH EYES: ICD-10-CM

## 2023-06-23 DIAGNOSIS — H53.9 VISUAL DISTURBANCE: ICD-10-CM

## 2023-06-23 DIAGNOSIS — H59.89 DRY EYE AFTER LASER IN SITU KERATOMILEUSIS: ICD-10-CM

## 2023-06-23 PROCEDURE — G0463 HOSPITAL OUTPT CLINIC VISIT: HCPCS | Mod: 25 | Performed by: OPHTHALMOLOGY

## 2023-06-23 PROCEDURE — 99214 OFFICE O/P EST MOD 30 MIN: CPT | Mod: 25 | Performed by: OPHTHALMOLOGY

## 2023-06-23 PROCEDURE — 68761 CLOSE TEAR DUCT OPENING: CPT | Performed by: OPHTHALMOLOGY

## 2023-06-23 PROCEDURE — 99207: CPT | Mod: E2 | Performed by: OPHTHALMOLOGY

## 2023-06-23 ASSESSMENT — CONF VISUAL FIELD
OS_SUPERIOR_NASAL_RESTRICTION: 0
OS_SUPERIOR_TEMPORAL_RESTRICTION: 0
OD_INFERIOR_TEMPORAL_RESTRICTION: 0
METHOD: COUNTING FINGERS
OD_INFERIOR_NASAL_RESTRICTION: 0
OD_NORMAL: 1
OS_INFERIOR_NASAL_RESTRICTION: 0
OD_SUPERIOR_NASAL_RESTRICTION: 0
OD_SUPERIOR_TEMPORAL_RESTRICTION: 0
OS_INFERIOR_TEMPORAL_RESTRICTION: 0
OS_NORMAL: 1

## 2023-06-23 ASSESSMENT — VISUAL ACUITY
OD_SC+: +2
OD_SC: 20/30
OS_SC: 20/25
OS_SC+: +1
METHOD: SNELLEN - LINEAR

## 2023-06-23 ASSESSMENT — TONOMETRY
OD_IOP_MMHG: 19
OS_IOP_MMHG: 20
IOP_METHOD: ICARE

## 2023-06-23 ASSESSMENT — EXTERNAL EXAM - RIGHT EYE: OD_EXAM: ENOPHTHALMOS

## 2023-06-23 ASSESSMENT — EXTERNAL EXAM - LEFT EYE: OS_EXAM: NORMAL

## 2023-06-23 ASSESSMENT — SLIT LAMP EXAM - LIDS
COMMENTS: DERMATOCHALASIS
COMMENTS: DERMATOCHALASIS

## 2023-06-23 NOTE — NURSING NOTE
Chief Complaints and History of Present Illnesses   Patient presents with     Follow Up     Conjunctivochalasis and PTK referral from Li Wiggins, OD     Chief Complaint(s) and History of Present Illness(es)     Follow Up            Associated symptoms: dryness and foreign body sensation.  Negative for eye pain and redness    Treatments tried: eye drops    Pain scale: 0/10    Comments: Conjunctivochalasis and PTK referral from Li Wiggins, JEREMÍAS          Comments    Pt has questions regarding possible PTK   He is having irritation nasal left eye with scleral lens edge awareness.   Has questions about conjunctivochalasis and possible solutions.  Currently using Systane PRN for dryness.     Peyman Lynn 9:03 AM June 23, 2023

## 2023-06-23 NOTE — PROGRESS NOTES
Chief complaint     Chief Complaints and History of Present Illnesses   Patient presents with     Follow Up     Conjunctivochalasis and PTK referral from Li Wiggins OD       Referring Provider: Dr. 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 Wiggins and Dr. Mcelroy.       Patient initially started to mention a haze over his vision when he saw Dr. Wiggins in February. He notes this haze is more prominent in the left eye rather than the right eye. The haze is worse with transitions. Usually in the peripheral vision. Lasts several hours but doesn't prevent him from driving or seeing well centrally. Doesn't know if it gets better with sunglasses or not. No history of migraines. No clear things that make it better or worse. When initially seen by Dr. Wiggins, concern for vitreous opacities that might be causing symptoms. Seen by Dr. Mcelroy, who did not see a pvd on dilated exam and recommended against yag vitreolysis. Did suggest possibility of PTK to resurface cornea and recommended evaluation with cornea team.     Per patient, Dr. Wiggins wanting to increase size of current scleral lenses and noticing some conjunctivochalasis (left eye>> right eye) that may make this difficult. Referring to Dr. Mina for consultation.       Past ocular history   Prior eye surgery/laser/Trauma:   - myopic LASIK 1998  - sinus surgeries (bilatearl (12/2021)   - silent sinus syndrome surgery (right eye) 4/2017  CTL wearer: none  Glasses : yes  Family Hx of eye disease: none    PMH     Past Medical History:   Diagnosis Date     Allergic rhinitis, cause unspecified      Anxiety      Depressive disorder      Diaphragmatic hernia without mention of obstruction or gangrene       Gastro-oesophageal reflux disease      Perforated abdominal viscus 3/10/2015     Reflux esophagitis      Rosacea      Sepsis, unspecified 3/19/2015    Sepsis due to perforated diverticulitis with abscess.      Stricture and stenosis of esophagus        PSH     Past Surgical History:   Procedure Laterality Date     COLECTOMY WITH COLOSTOMY, COMBINED N/A 03/09/2015    Procedure: COMBINED COLECTOMY WITH COLOSTOMY;  Surgeon: Caroline Chong MD;  Location: [facility]     CYSTOSCOPY N/A 06/03/2015    Procedure: CYSTOSCOPY;  Surgeon: Alverto Ribeiro MD;  Location: [facility]     INSERT STENT URETER Left 06/03/2015    Procedure: INSERT STENT URETER (PRE-OP);  Surgeon: Alverto Ribeiro MD;  Location: [facility]     LAPAROSCOPIC ASSISTED COLOSTOMY TAKEDOWN N/A 06/03/2015    Procedure: LAPAROSCOPIC ASSISTED COLOSTOMY TAKEDOWN;  Surgeon: Caroline Chong MD;  Location: [facility]     LAPAROTOMY EXPLORATORY N/A 03/09/2015    Procedure: LAPAROTOMY EXPLORATORY;  Surgeon: Caroline Chong MD;  Location: [facility]     LASIK  1998     [facility] NONSPECIFIC PROCEDURE      tons       Meds     Current Outpatient Medications   Medication     budesonide (PULMICORT) 1 MG/2ML neb solution     ipratropium (ATROVENT) 0.06 % nasal spray     phentermine (ADIPEX-P) 37.5 MG tablet     sodium chloride 0.9 % neb solution     VENTOLIN  (90 Base) MCG/ACT inhaler     No current facility-administered medications for this visit.       Drops Currently Taking   AT PRN    Assessment/Plan     # subjective visual disturbance OS  - noted improvement with scleral lenses   - Mild increase in IOP each eye  - will have pt return in 4-6 weeks for VF and OCT RNFL to evaluate for glaucoma    # Conjunctivochalasis OU  - mild/moderate conjunctivochalasis in both eyes left eye>> right eye   - defer surgical treatment at this time    # Dry eye syndromeOU  - AT QID PRN  Lower punctal plug rubbing on the conj OS  -   # h/o LASIK each eye   - mild corneal irregularity,  jorge in 2022 does not show ectasia, no active surface issue. Less likely the cause of his symptoms    #Glaucoma suspect:  IOP borderline with large disk 0.5  Plan for additional testing     # choroidal nevus  - follows with Dr. Mcmahon    # h/o silent sinus syndrome   - follows with Dr. Anaya/Dr. Neil    Plan:  - LLL punctal plug removed as it was touching surface of eye  - placed medium eagle punctal plug left lower lid  - placed 0.4 oasis punctal plug SANDI  -VF and OCT RNFL next visit  Discussed that the visual issued may be due to dryness vs glaucoma    Follow up:  1-2 months with VF, OCT RNFL    Follow up with other provider :   Dr. Anaya (ENT)   Dr. Feliciano Serrato MD  Resident Physician, PGY-3  Department of Ophthalmology    Attending Physician Attestation:  Attending Physician Attestation: Complete documentation of historical and exam elements from today&#39;s encounter can be found in the full encounter summary report (not reduplicated in this progress note). I personally obtained the chief complaint(s) and history of present illness. I confirmed and edited as necessary the review of systems, past medical/surgical history, family history, social history, and examination findings as documented by others; and I examined the patient myself. I personally reviewed the relevant tests, images, and reports as documented above. I formulated and edited as necessary the assessment and plan and discussed the findings and management plan with the patient and family. I was present for the entire procedure(s). Candy Mina M.D

## 2023-07-10 ENCOUNTER — OFFICE VISIT (OUTPATIENT)
Dept: OPHTHALMOLOGY | Facility: CLINIC | Age: 48
End: 2023-07-10
Attending: OPHTHALMOLOGY
Payer: COMMERCIAL

## 2023-07-10 DIAGNOSIS — Z98.890 H/O LASER ASSISTED IN SITU KERATOMILEUSIS: ICD-10-CM

## 2023-07-10 DIAGNOSIS — H04.129 DRY EYE AFTER LASER IN SITU KERATOMILEUSIS: ICD-10-CM

## 2023-07-10 DIAGNOSIS — H59.89 DRY EYE AFTER LASER IN SITU KERATOMILEUSIS: ICD-10-CM
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
## 2023-07-10 DIAGNOSIS — H53.9 VISUAL DISTURBANCE: ICD-10-CM

## 2023-07-10 DIAGNOSIS — H40.003 GLAUCOMA SUSPECT OF BOTH EYES: Primary | ICD-10-CM

## 2023-07-10 DIAGNOSIS — D31.32 CHOROIDAL NEVUS OF LEFT EYE: ICD-10-CM

## 2023-07-10 PROCEDURE — 68760 CLOSE TEAR DUCT OPENING: CPT | Performed by: OPHTHALMOLOGY

## 2023-07-10 PROCEDURE — 92133 CPTRZD OPH DX IMG PST SGM ON: CPT | Performed by: OPHTHALMOLOGY

## 2023-07-10 PROCEDURE — G0463 HOSPITAL OUTPT CLINIC VISIT: HCPCS | Mod: 25 | Performed by: OPHTHALMOLOGY

## 2023-07-10 PROCEDURE — 92083 EXTENDED VISUAL FIELD XM: CPT | Performed by: OPHTHALMOLOGY

## 2023-07-10 PROCEDURE — 99214 OFFICE O/P EST MOD 30 MIN: CPT | Mod: 25 | Performed by: OPHTHALMOLOGY

## 2023-07-10 ASSESSMENT — VISUAL ACUITY
OD_CC: 20/20
OS_PH_CC: 20/40
OS_CC: 20/60
METHOD: SNELLEN - LINEAR
CORRECTION_TYPE: CONTACTS
OS_CC+: -2
OS_PH_CC+: -2

## 2023-07-10 ASSESSMENT — REFRACTION_WEARINGRX
OS_SPHERE: -0.75
OS_AXIS: 010
OD_ADD: +1.50
OD_AXIS: 145
OD_CYLINDER: +0.50
OS_CYLINDER: +0.75
OS_ADD: +1.50
OD_SPHERE: -1.00

## 2023-07-10 ASSESSMENT — CONF VISUAL FIELD
OD_NORMAL: 1
OS_NORMAL: 1
OS_SUPERIOR_NASAL_RESTRICTION: 0
OD_INFERIOR_TEMPORAL_RESTRICTION: 0
OS_INFERIOR_NASAL_RESTRICTION: 0
OD_INFERIOR_NASAL_RESTRICTION: 0
OD_SUPERIOR_NASAL_RESTRICTION: 0
OS_INFERIOR_TEMPORAL_RESTRICTION: 0
OS_SUPERIOR_TEMPORAL_RESTRICTION: 0
OD_SUPERIOR_TEMPORAL_RESTRICTION: 0
METHOD: COUNTING FINGERS

## 2023-07-10 ASSESSMENT — EXTERNAL EXAM - RIGHT EYE: OD_EXAM: ENOPHTHALMOS

## 2023-07-10 ASSESSMENT — EXTERNAL EXAM - LEFT EYE: OS_EXAM: NORMAL

## 2023-07-10 NOTE — PROGRESS NOTES
"Chief complaint     Chief Complaints and History of Present Illnesses   Patient presents with     Follow Up     Conjunctivochalasis and PTK referral from Li Wiggins, OD     Interval hx: \"I think my left eye top plug has fallen out.\"  Lately noticing haziness both eyes LE>RE Constant dryness in both eyes, uses Systane PRN for relief. No other concerns.       Referring Provider: Dr. 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 Wiggins and Dr. Mcelroy.       Patient initially started to mention a haze over his vision when he saw Dr. Wiggins in February. He notes this haze is more prominent in the left eye rather than the right eye. The haze is worse with transitions. Usually in the peripheral vision. Lasts several hours but doesn't prevent him from driving or seeing well centrally. Doesn't know if it gets better with sunglasses or not. No history of migraines. No clear things that make it better or worse. When initially seen by Dr. Wiggins, concern for vitreous opacities that might be causing symptoms. Seen by Dr. Mcelroy, who did not see a pvd on dilated exam and recommended against yag vitreolysis. Did suggest possibility of PTK to resurface cornea and recommended evaluation with cornea team.     Per patient, Dr. Wiggins wanting to increase size of current scleral lenses and noticing some conjunctivochalasis (left eye>> right eye) that may make this difficult. Referring to Dr. Mina for consultation.       Past ocular history   Prior eye surgery/laser/Trauma:   - myopic LASIK 1998  - sinus surgeries (bilatearl (12/2021)   - silent sinus syndrome surgery (right eye) 4/2017  CTL wearer: none  Glasses : yes  Family Hx of eye disease: none    PMH     Past Medical History: "   Diagnosis Date     Allergic rhinitis, cause unspecified      Anxiety      Depressive disorder      Diaphragmatic hernia without mention of obstruction or gangrene      Gastro-oesophageal reflux disease      Perforated abdominal viscus 3/10/2015     Reflux esophagitis      Rosacea      Sepsis, unspecified 3/19/2015    Sepsis due to perforated diverticulitis with abscess.      Stricture and stenosis of esophagus        PSH     Past Surgical History:   Procedure Laterality Date     COLECTOMY WITH COLOSTOMY, COMBINED N/A 03/09/2015    Procedure: COMBINED COLECTOMY WITH COLOSTOMY;  Surgeon: Caroline Chong MD;  [facility]: [facility]     CYSTOSCOPY N/A 06/03/2015    Procedure: CYSTOSCOPY;  Surgeon: Alverto Ribeiro MD;  [facility]: [facility]     INSERT STENT URETER Left 06/03/2015    Procedure: INSERT STENT URETER (PRE-OP);  Surgeon: Alverto Ribeiro MD;  [facility]: [facility]     LAPAROSCOPIC ASSISTED COLOSTOMY TAKEDOWN N/A 06/03/2015    Procedure: LAPAROSCOPIC ASSISTED COLOSTOMY TAKEDOWN;  Surgeon: Caroline Chong MD;  [facility]: [facility]     LAPAROTOMY EXPLORATORY N/A 03/09/2015    Procedure: LAPAROTOMY EXPLORATORY;  Surgeon: Caroline Chong MD;  Location: [facility]     LASIK  1998     [facility] NONSPECIFIC PROCEDURE      tons       Meds     Current Outpatient Medications   Medication     budesonide (PULMICORT) 1 MG/2ML neb solution     ipratropium (ATROVENT) 0.06 % nasal spray     phentermine (ADIPEX-P) 37.5 MG tablet     sodium chloride 0.9 % neb solution     VENTOLIN  (90 Base) MCG/ACT inhaler     No current facility-administered medications for this visit.       Drops Currently Taking   AT PRN    Assessment/Plan     # subjective visual disturbance OS  - noted improvement with scleral lenses   - Mild increase in IOP each eye  OCT RNFL and VF are normal  No need for glaucoma eye drops ow    # Conjunctivochalasis OU  - mild/moderate conjunctivochalasis in both eyes left eye>> right eye   - defer surgical  treatment at this time    # Dry eye syndromeOU  - AT QID PRN   - placed medium eagle punctal plug left lower lid last visit    # h/o LASIK each eye   - mild corneal irregularity, jorge in 2022 does not show ectasia, no active surface issue. Less likely the cause of his symptoms    #Glaucoma suspect:  IOP borderline with large disk 0.5  VF and OCT RNFL are normal  Watch for now    # choroidal nevus  - follows with Dr. Mcmahon    # h/o silent sinus syndrome   - follows with Dr. Anaya/Dr. Neil    Plan:  - visual disturbance is most likely due to dryness   -glaucoma testing is normal  discussed punctal cautery. R/B/A discussed including epiphora. Patient agree to proceed with the upper punctum each eye.  We will keep the silicon plugs in lower lid and we can remove them in case of epihphora    Follow up:  2-3 months VT    Follow up with other provider :   Dr. Anaya (ENT)   Dr. Feliciano Serrato MD  Resident Physician, PGY-3  Department of Ophthalmology    Attending Physician Attestation:  Attending Physician Attestation: Complete documentation of historical and exam elements from today&#39;s encounter can be found in the full encounter summary report (not reduplicated in this progress note). I personally obtained the chief complaint(s) and history of present illness. I confirmed and edited as necessary the review of systems, past medical/surgical history, family history, social history, and examination findings as documented by others; and I examined the patient myself. I personally reviewed the relevant tests, images, and reports as documented above. I formulated and edited as necessary the assessment and plan and discussed the findings and management plan with the patient and family. I was present for the entire procedure(s). Candy Mina M.D

## 2023-07-10 NOTE — NURSING NOTE
"Chief Complaints and History of Present Illnesses   Patient presents with     Follow Up     Chief Complaint(s) and History of Present Illness(es)     Follow Up            Associated symptoms: dryness and photophobia.  Negative for eye pain and headache    Treatments tried: eye drops    Pain scale: 0/10          Comments    \"I think my left eye top plug has fallen out.\"   Lately noticing haziness both eyes LE>RE   Constant dryness in both eyes, uses Systane PRN for relief.  No other concerns.     Peyman Lynn 9:17 AM July 10, 2023                    "

## 2023-08-01 ENCOUNTER — MYC MEDICAL ADVICE (OUTPATIENT)
Dept: OTOLARYNGOLOGY | Facility: CLINIC | Age: 48
End: 2023-08-01
Payer: COMMERCIAL

## 2023-08-01 DIAGNOSIS — H04.123 DRY EYES: ICD-10-CM

## 2023-08-01 NOTE — TELEPHONE ENCOUNTER
Refills have been requested for the following medications:         budesonide (PULMICORT) 1 MG/2ML neb solution       Patient Comment: Could I get a 90 day renewal for budesonide sent to the [facility] pharmacy? My current prescription is 1 MG/2ML neb solution per 8oz of   SODIUM CHLORIDE IRRIG SOLUTION.  I will also need a SODIUM CHLORIDE IRRIG SOLUTION sent to [facility] as well. I think that works out to [phone] 1000ml/90days       Patient never seen at [facility], sent patient My Chart message to send to PCP    Haven Hanson RN

## 2023-08-03 DIAGNOSIS — J45.40 MODERATE PERSISTENT REACTIVE AIRWAY DISEASE WITHOUT COMPLICATION: Primary | ICD-10-CM

## 2023-08-03 RX ORDER — BUDESONIDE 1 MG/2ML
INHALANT ORAL
Qty: 180 ML | Refills: 0 | Status: SHIPPED | OUTPATIENT
Start: 2023-08-03

## 2023-08-03 RX ORDER — MAGNESIUM HYDROXIDE 1200 MG/15ML
LIQUID ORAL
Qty: 45000 ML | Refills: 0 | Status: SHIPPED | OUTPATIENT
Start: 2023-08-03

## 2023-08-11 ENCOUNTER — OFFICE VISIT (OUTPATIENT)
Dept: OTOLARYNGOLOGY | Facility: CLINIC | Age: 48
End: 2023-08-11
Payer: COMMERCIAL

## 2023-08-11 VITALS
HEIGHT: 70 IN | BODY MASS INDEX: 36.22 KG/M2 | WEIGHT: 253 LBS | DIASTOLIC BLOOD PRESSURE: 83 MMHG | HEART RATE: 77 BPM | SYSTOLIC BLOOD PRESSURE: 131 MMHG

## 2023-08-11 DIAGNOSIS — R09.82 POST-NASAL DRIP: ICD-10-CM

## 2023-08-11 DIAGNOSIS — J45.40 MODERATE PERSISTENT REACTIVE AIRWAY DISEASE WITHOUT COMPLICATION: ICD-10-CM

## 2023-08-11 DIAGNOSIS — J32.0 CHRONIC MAXILLARY SINUSITIS: Primary | ICD-10-CM

## 2023-08-11 PROCEDURE — 31231 NASAL ENDOSCOPY DX: CPT | Performed by: OTOLARYNGOLOGY

## 2023-08-11 PROCEDURE — 99212 OFFICE O/P EST SF 10 MIN: CPT | Mod: 25 | Performed by: OTOLARYNGOLOGY

## 2023-08-11 RX ORDER — IPRATROPIUM BROMIDE 21 UG/1
2 SPRAY, METERED NASAL 3 TIMES DAILY
Qty: 30 ML | Refills: 6 | Status: SHIPPED | OUTPATIENT
Start: 2023-08-11

## 2023-08-11 RX ORDER — BUDESONIDE 0.5 MG/2ML
INHALANT ORAL
Qty: 120 ML | Refills: 6 | Status: SHIPPED | OUTPATIENT
Start: 2023-08-11

## 2023-08-11 ASSESSMENT — PAIN SCALES - GENERAL: PAINLEVEL: NO PAIN (0)

## 2023-08-11 NOTE — PROGRESS NOTES
[facility]  Return patient Visit      Encounter date:   August 11th, 2023    Referring Provider:   No referring provider defined for this encounter.    Chief Complaint: Consult    History of Present Illness/initial visit:   Iraj Ghosh is a 48 year old male who presents for consultation regarding silent sinus syndrome. She has seen Dr. Jiménez in the past and was started on irrigations and nasal steroid spray. He has had drainage form the nose that will become green every 2-3 weeks. He rinses with saline. He was previously treated with Augmentin for recent infection. He has been spitting up mucus after treatment with continued congestion.     Current visit:  Continues with budesonide rinses and Atrovent  No new symptoms   No flares treated with antibiotics or steroids    [facility]:  Surgery with Dr. Grady.     Review of systems: A 14-point review of systems has been conducted and was negative for any notable symptoms, except as dictated in the history of present illness.     Medical History:  Past Medical History:   Diagnosis Date    Allergic rhinitis, cause unspecified     Anxiety     Depressive disorder     Diaphragmatic hernia without mention of obstruction or gangrene     Gastro-oesophageal reflux disease     Perforated abdominal viscus 3/10/2015    Reflux esophagitis     Rosacea     Sepsis, unspecified 3/19/2015    Sepsis due to perforated diverticulitis with abscess.     Stricture and stenosis of esophagus         Surgical History:   Past Surgical History:   Procedure Laterality Date    COLECTOMY WITH COLOSTOMY, COMBINED N/A 03/09/2015    Procedure: COMBINED COLECTOMY WITH COLOSTOMY;  Surgeon: Caroline Chong MD;  Location:  OR    CYSTOSCOPY N/A 06/03/2015    Procedure: CYSTOSCOPY;  Surgeon: Alverto Ribeiro MD;  Location:  OR    INSERT STENT URETER Left 06/03/2015    Procedure: INSERT STENT URETER (PRE-OP);  Surgeon: Alverto Ribeiro MD;  Location:  OR     LAPAROSCOPIC ASSISTED COLOSTOMY TAKEDOWN N/A 06/03/2015    Procedure: LAPAROSCOPIC ASSISTED COLOSTOMY TAKEDOWN;  Surgeon: Caroline Chong MD;  [facility]: [facility]    LAPAROTOMY EXPLORATORY N/A 03/09/2015    Procedure: LAPAROTOMY EXPLORATORY;  Surgeon: Caroline Chong MD;  Location: [facility] OR    LASIK  1998    [facility] NONSPECIFIC PROCEDURE      tons        Family History:  Family History   Problem Relation Age of Onset    Diabetes Mother         gestational    Cancer Mother         Ovarian    Thyroid Disease Mother     Coronary Artery Disease Father     Glaucoma Maternal Grandmother     Hypertension Maternal Grandmother     Cancer Maternal Grandmother     Diabetes Maternal Grandmother     Cancer Maternal Grandfather         Prostate    Heart Disease Maternal Grandfather     Cerebrovascular Disease Maternal Grandfather     Alzheimer Disease Maternal Grandfather     Diabetes Maternal Grandfather     Glaucoma Maternal Grandfather     Hypertension Maternal Grandfather     Macular Degeneration Maternal Grandfather     Thyroid Disease Sister     Glasses (<7 y/o) Sister     Macular Degeneration Other     Hypertension Other         Social History:   Social History     Socioeconomic History    Marital status: Single   Tobacco Use    Smoking status: Former     Types: Dip, chew, snus or snuff    Smokeless tobacco: Former     Types: Chew   Substance and Sexual Activity    Alcohol use: Not Currently     Comment: 12 weekly    Drug use: Not Currently     Types: Marijuana    Sexual activity: Yes     Partners: Male     Birth control/protection: Condom, None   Other Topics Concern    Parent/sibling w/ CABG, MI or angioplasty before 65F 55M? Yes     Social Determinants of Health     Intimate Partner Violence: Not At Risk (12/7/2022)    Humiliation, Afraid, Rape, and Kick questionnaire     Fear of Current or Ex-Partner: No     Emotionally Abused: No     Physically Abused: No     Sexually Abused: No        Physical Exam:  Vital  "signs: /83 (BP Location: Left arm, Patient Position: Sitting, Cuff Size: Adult Large)   Pulse 77   Ht 1.765 m (5' 9.5\")   Wt 114.8 kg (253 lb)   BMI 36.83 kg/m     General Appearance: No acute distress, appropriate demeanor, conversant  Eyes: moist conjunctivae; EOMI; pupils symmetric; visual acuity grossly intact; no proptosis  Head: normocephalic; overall symmetric appearance without deformity  Face: overall symmetric without deformity; HB I/VI  Ears: Normal appearance of external ear; external meatus normal in appearance; TMs intact without perforation bilaterally;   Nose: No external deformity; see endoscopy   Oral Cavity/oropharynx: Normal appearance of mucosa; tongue midline; no mass or lesions; oropharynx without obvious mucosal abnormality  Neck: no palpable lymphadenopathy; thyroid without palpable nodules  Lungs: symmetric chest rise; no wheezing  CV: Good distal perfusion; normal hear rate  Extremities: No deformity  Neurologic Exam: Cranial nerves II-XII are grossly intact; no focal deficit    Procedure Note  Procedure performed: Rigid nasal endoscopy  Indication: To evaluate for sinonasal pathology not visualized on routine anterior rhinoscopy  Anesthesia: 4% topical lidocaine with 0.05% oxymetazoline  Description of procedure: A 30 degree, 3 mm rigid endoscope was inserted into bilateral nasal cavities and the nasal valves, nasal cavity, middle meatus, sphenoethmoid recess, and nasopharynx were thoroughly evaluated for evidence of obstruction, edema, purulence, polyps and/or mass/lesion.     Allen-Francisco Endoscopic Scoring System  Endoscopic observation Right Left   Polyps in middle meatus (0 = absent, 1 = restricted to middle meatus, 2 = Beyond middle meatus) 0 0   Discharge (0 = absent, 1 = thin and clear, 2 = thick, purulent) 0 0   Edema (0 = absent, 1 = mild-moderate, 2 = moderate-severe) 0 0   Crusting (0 = absent, 1 = mild-moderate, 2 = moderate-severe) 0 0   Scarring (0= absent, 1 = " mild-moderate, 2 = moderate-severe) 0 0   Total 0 0     Findings  RT: evidence of blanca antrostomy; no evidence of severe infection or rhinolith  LT: perforation of left IT; evidence of prior sinus surgery; no evidence of inflammation or infection    The patient tolerated the procedure well without complication.     Laboratory Review:  n/a    Imaging Review:  n/a    Pathology Review:  n/a    Assessment/Medical Decision Making:  Silent sinus syndrome  S/p ESS in the past, x2   IT perforation  Atypical facial pain    Plan:  Bilateral endoscopy performed shows no evidence of infection or inflammation today. He is now past treatment and the sinuses are likely in best condition. For maintenance and mucus suppression he should continue budesonide rinses and continue Atrovent. Follow up as needed    Andrew Anaya MD    [facility]  Rhinology  Endoscopic Skull Base Surgery  [facility]  Department of Otolaryngology - Head & Neck Surgery

## 2023-08-11 NOTE — NURSING NOTE
"Chief Complaint   Patient presents with    RECHECK     3 month follow up       Blood pressure 131/83, pulse 77, height 1.765 m (5' 9.5\"), weight 114.8 kg (253 lb).    Katheryn Stanton, EMT    "

## 2023-08-11 NOTE — LETTER
8/11/2023       RE: Iraj Ghosh  [address] [place] [address]     Dear Colleague,    Thank you for referring your patient, Iraj Ghosh, to the [facility] E[facility] AND [facility]ROAT [facility]INIC [place] at [facility]. Please see a copy of my visit note below.      [facility]  Return patient Visit      Encounter date:   August 11th, 2023    Referring Provider:   No referring provider defined for this encounter.    Chief Complaint: Consult    History of Present Illness/initial visit:   Iraj Ghosh is a 48 year old male who presents for consultation regarding silent sinus syndrome. She has seen Dr. Jiménez in the past and was started on irrigations and nasal steroid spray. He has had drainage form the nose that will become green every 2-3 weeks. He rinses with saline. He was previously treated with Augmentin for recent infection. He has been spitting up mucus after treatment with continued congestion.     Current visit:  Continues with budesonide rinses and Atrovent  No new symptoms   No flares treated with antibiotics or steroids    [facility]:  Surgery with Dr. Grady.     Review of systems: A 14-point review of systems has been conducted and was negative for any notable symptoms, except as dictated in the history of present illness.     Medical History:  Past Medical History:   Diagnosis Date    Allergic rhinitis, cause unspecified     Anxiety     Depressive disorder     Diaphragmatic hernia without mention of obstruction or gangrene     Gastro-oesophageal reflux disease     Perforated abdominal viscus 3/10/2015    Reflux esophagitis     Rosacea     Sepsis, unspecified 3/19/2015    Sepsis due to perforated diverticulitis with abscess.     Stricture and stenosis of esophagus         Surgical History:   Past Surgical History:   Procedure Laterality Date    COLECTOMY WITH COLOSTOMY, COMBINED N/A 03/09/2015    Procedure: COMBINED  COLECTOMY WITH COLOSTOMY;  Surgeon: Caroline Chong MD;  Location: [facility]    CYSTOSCOPY N/A 06/03/2015    Procedure: CYSTOSCOPY;  Surgeon: Alverto Ribeiro MD;  Location: [facility] OR    INSERT STENT URETER Left 06/03/2015    Procedure: INSERT STENT URETER (PRE-OP);  Surgeon: Alverto Ribeiro MD;  Location: [facility]    LAPAROSCOPIC ASSISTED COLOSTOMY TAKEDOWN N/A 06/03/2015    Procedure: LAPAROSCOPIC ASSISTED COLOSTOMY TAKEDOWN;  Surgeon: Caroline Chong MD;  [facility]: [facility]    LAPAROTOMY EXPLORATORY N/A 03/09/2015    Procedure: LAPAROTOMY EXPLORATORY;  Surgeon: Caroline Chong MD;  Location: [facility] OR    LASIK  1998    ZZC NONSPECIFIC PROCEDURE      tons        Family History:  Family History   Problem Relation Age of Onset    Diabetes Mother         gestational    Cancer Mother         Ovarian    Thyroid Disease Mother     Coronary Artery Disease Father     Glaucoma Maternal Grandmother     Hypertension Maternal Grandmother     Cancer Maternal Grandmother     Diabetes Maternal Grandmother     Cancer Maternal Grandfather         Prostate    Heart Disease Maternal Grandfather     Cerebrovascular Disease Maternal Grandfather     Alzheimer Disease Maternal Grandfather     Diabetes Maternal Grandfather     Glaucoma Maternal Grandfather     Hypertension Maternal Grandfather     Macular Degeneration Maternal Grandfather     Thyroid Disease Sister     Glasses (<7 y/o) Sister     Macular Degeneration Other     Hypertension Other         Social History:   Social History     Socioeconomic History    Marital status: Single   Tobacco Use    Smoking status: Former     Types: Dip, chew, snus or snuff    Smokeless tobacco: Former     Types: Chew   Substance and Sexual Activity    Alcohol use: Not Currently     Comment: 12 weekly    Drug use: Not Currently     Types: Marijuana    Sexual activity: Yes     Partners: Male     Birth control/protection: Condom, None   Other Topics Concern    Parent/sibling w/ CABG, MI or  "angioplasty before 65F 55M? Yes     Social Determinants of Health     Intimate Partner Violence: Not At Risk (12/7/2022)    Humiliation, Afraid, Rape, and Kick questionnaire     Fear of Current or Ex-Partner: No     Emotionally Abused: No     Physically Abused: No     Sexually Abused: No        Physical Exam:  Vital signs: /83 (BP Location: Left arm, Patient Position: Sitting, Cuff Size: Adult Large)   Pulse 77   Ht 1.765 m (5' 9.5\")   Wt 114.8 kg (253 lb)   BMI 36.83 kg/m     General Appearance: No acute distress, appropriate demeanor, conversant  Eyes: moist conjunctivae; EOMI; pupils symmetric; visual acuity grossly intact; no proptosis  Head: normocephalic; overall symmetric appearance without deformity  Face: overall symmetric without deformity; HB I/VI  Ears: Normal appearance of external ear; external meatus normal in appearance; TMs intact without perforation bilaterally;   Nose: No external deformity; see endoscopy   Oral Cavity/oropharynx: Normal appearance of mucosa; tongue midline; no mass or lesions; oropharynx without obvious mucosal abnormality  Neck: no palpable lymphadenopathy; thyroid without palpable nodules  Lungs: symmetric chest rise; no wheezing  CV: Good distal perfusion; normal hear rate  Extremities: No deformity  Neurologic Exam: Cranial nerves II-XII are grossly intact; no focal deficit    Procedure Note  Procedure performed: Rigid nasal endoscopy  Indication: To evaluate for sinonasal pathology not visualized on routine anterior rhinoscopy  Anesthesia: 4% topical lidocaine with 0.05% oxymetazoline  Description of procedure: A 30 degree, 3 mm rigid endoscope was inserted into bilateral nasal cavities and the nasal valves, nasal cavity, middle meatus, sphenoethmoid recess, and nasopharynx were thoroughly evaluated for evidence of obstruction, edema, purulence, polyps and/or mass/lesion.     Marky-Francisco Endoscopic Scoring System  Endoscopic observation Right Left   Polyps in " middle meatus (0 = absent, 1 = restricted to middle meatus, 2 = Beyond middle meatus) 0 0   Discharge (0 = absent, 1 = thin and clear, 2 = thick, purulent) 0 0   Edema (0 = absent, 1 = mild-moderate, 2 = moderate-severe) 0 0   Crusting (0 = absent, 1 = mild-moderate, 2 = moderate-severe) 0 0   Scarring (0= absent, 1 = mild-moderate, 2 = moderate-severe) 0 0   Total 0 0     Findings  RT: evidence of blanca antrostomy; no evidence of severe infection or rhinolith  LT: perforation of left IT; evidence of prior sinus surgery; no evidence of inflammation or infection    The patient tolerated the procedure well without complication.     Laboratory Review:  n/a    Imaging Review:  n/a    Pathology Review:  n/a    Assessment/Medical Decision Making:  Silent sinus syndrome  S/p ESS in the past, x2   IT perforation  Atypical facial pain    Plan:  Bilateral endoscopy performed shows no evidence of infection or inflammation today. He is now past treatment and the sinuses are likely in best condition. For maintenance and mucus suppression he should continue budesonide rinses and continue Atrovent. Follow up as needed    Andrew Anaya MD    [facility]  Rhinology  Endoscopic Skull Base Surgery  [facility]  Department of Otolaryngology - Head & Neck Surgery

## 2023-08-11 NOTE — PATIENT INSTRUCTIONS
You were seen in the ENT Clinic today by Dr. Anaya. If you have any questions or concerns after your appointment, please contact us (see below)       2.   The following recommendations have been made based upon your appointment today:   -Continue budesonide rinses as previously prescribed.   -Continue Atrovent nasal spray. We have updated this prescription to a lower concentration.   We have sent refills of both prescriptions to your preferred pharmacy.      3.   Please return to the ENT clinic as needed.           How to Contact Us:  Send a BringMeTheNews message to your provider. Our team will respond to you via BringMeTheNews. Occasionally, we will need to call you to get further information.  For urgent matters (Monday-Friday), call the ENT Clinic: [phone] and speak with a call center team member - they will route your call appropriately.   If you'd like to speak directly with a nurse, please find our contact information below. We do our best to check voicemail frequently throughout the day, and will work to call you back within 1-2 days. For urgent matters, please use the general clinic phone numbers listed above.        Shahida WILDER RN  ENT RN Care Coordinator  Direct: [phone]  Clarita RAVI LPN  Direct: [phone]         [facility]  Department of Otolaryngology

## 2023-10-02 ENCOUNTER — OFFICE VISIT (OUTPATIENT)
Dept: OPTOMETRY | Facility: CLINIC | Age: 48
End: 2023-10-02
Payer: COMMERCIAL

## 2023-10-02 DIAGNOSIS — H18.523 ANTERIOR BASEMENT MEMBRANE DYSTROPHY OF BOTH EYES: ICD-10-CM

## 2023-10-02 DIAGNOSIS — H04.123 DRY EYES: Primary | ICD-10-CM

## 2023-10-02 DIAGNOSIS — H52.213 IRREGULAR ASTIGMATISM OF BOTH EYES: ICD-10-CM

## 2023-10-02 ASSESSMENT — TONOMETRY
OS_IOP_MMHG: 23
IOP_METHOD: ICARE
OD_IOP_MMHG: 22

## 2023-10-02 ASSESSMENT — REFRACTION_CURRENTRX
OD_CYLINDER: -1.00
OD_AXIS: 135
OS_CYLINDER: -1.50
OS_DIAMETER: 14.8
OD_DIAMETER: 14.8
OS_ADDL_SPECS: BOSTON XO BLUE, HYDRAPEG
OS_BASECURVE: 3.9/7.5
OS_AXIS: 045
OD_SPHERE: -3.00
OS_SPHERE: -2.00
OD_BASECURVE: 3.9/7.5
OD_BRAND: ZENLENS RC

## 2023-10-02 ASSESSMENT — VISUAL ACUITY
METHOD: SNELLEN - LINEAR
CORRECTION_TYPE: CONTACTS
OS_CC: 20/70
OD_CC: 20/20

## 2023-10-02 NOTE — PROGRESS NOTES
"A/P  1.) Irregular astigmatism OU  -Likely 2' to ABMD OU  -Symptomatic for blurred vision, not \"crisp\" left eye>right eye  -Excellent objective vision with scleral lens. BCVA 20/15 both eyes  -Overall doing well in scleral lenses. Good comfort/vision. Right lens broke, needs duplicate  -Left lens he would like more monovision/near power. Increase add by +0.50    2.) Dry Eyes OU  -Corneal surface stable in scleral lens  -Following with Dr. Mina    Order new pair and mail direct. Follow-up prn     I have confirmed the patient's CC, HPI and reviewed Past Medical History, Past Surgical History, Social History, Family History, Problem List, Medication List and agree with Tech note.     Li Wiggins, OD FAAO FSLS    "

## 2023-10-03 ASSESSMENT — EXTERNAL EXAM - LEFT EYE: OS_EXAM: NORMAL

## 2023-10-03 ASSESSMENT — EXTERNAL EXAM - RIGHT EYE: OD_EXAM: ENOPHTHALMOS

## 2023-10-27 ENCOUNTER — OFFICE VISIT (OUTPATIENT)
Dept: OPHTHALMOLOGY | Facility: CLINIC | Age: 48
End: 2023-10-27
Payer: COMMERCIAL

## 2023-10-27 DIAGNOSIS — H18.523 ANTERIOR BASEMENT MEMBRANE DYSTROPHY (ABMD) OF BOTH EYES: ICD-10-CM

## 2023-10-27 DIAGNOSIS — H52.213 IRREGULAR ASTIGMATISM OF BOTH EYES: Primary | ICD-10-CM

## 2023-10-27 PROCEDURE — 99213 OFFICE O/P EST LOW 20 MIN: CPT | Performed by: OPTOMETRIST

## 2023-10-27 ASSESSMENT — REFRACTION_CURRENTRX
OS_SPHERE: -1.50
OS_DIAMETER: 14.8
OS_ADDL_SPECS: BOSTON XO BLUE, HYDRAPEG
OD_CYLINDER: -1.00
OD_AXIS: 135
OS_DIAMETER: 14.8
OD_BASECURVE: 3.9/7.5
OD_SPHERE: -3.00
OD_AXIS: 135
OD_SPHERE: -3.00
OD_BASECURVE: 3.9/7.5
OS_ADDL_SPECS: BOSTON XO BLUE, HYDRAPEG
OS_CYLINDER: -1.50
OS_AXIS: 045
OS_BASECURVE: 3.9/7.5
OD_DIAMETER: 14.8
OS_SPHERE: -2.00
OD_BRAND: ZENLENS RC
OS_AXIS: 045
OD_CYLINDER: -1.00
OS_CYLINDER: -1.50
OD_DIAMETER: 14.8
OS_BASECURVE: 3.9/7.5

## 2023-10-27 ASSESSMENT — CONF VISUAL FIELD
OS_SUPERIOR_TEMPORAL_RESTRICTION: 0
OS_SUPERIOR_NASAL_RESTRICTION: 0
OD_SUPERIOR_TEMPORAL_RESTRICTION: 0
OD_SUPERIOR_NASAL_RESTRICTION: 0
OS_INFERIOR_TEMPORAL_RESTRICTION: 0
OD_NORMAL: 1
OS_NORMAL: 1
OD_INFERIOR_NASAL_RESTRICTION: 0
OS_INFERIOR_NASAL_RESTRICTION: 0
OD_INFERIOR_TEMPORAL_RESTRICTION: 0

## 2023-10-27 ASSESSMENT — TONOMETRY
OD_IOP_MMHG: 14
IOP_METHOD: ICARE
OS_IOP_MMHG: CL

## 2023-10-27 ASSESSMENT — VISUAL ACUITY
OD_CC: J16
CORRECTION_TYPE: CONTACTS
OS_CC: J3-3
METHOD: SNELLEN - LINEAR
OS_PH_CC: 20/25
OD_CC: 20/20
OS_PH_CC+: -2
OS_CC: 20/50

## 2023-10-27 ASSESSMENT — EXTERNAL EXAM - RIGHT EYE: OD_EXAM: ENOPHTHALMOS

## 2023-10-27 ASSESSMENT — EXTERNAL EXAM - LEFT EYE: OS_EXAM: NORMAL

## 2023-10-27 NOTE — PROGRESS NOTES
"A/P  1.) Irregular astigmatism OU  -Likely 2' to ABMD OU  -Symptomatic for blurred vision, not \"crisp\" left eye>right eye  -Excellent objective vision with scleral lens. BCVA 20/15 both eyes  -Reordered duplicate of right lens after last exam. Ongoing injection/redness since. No obvious cause today, may be related to previous lens warpage and now duplicate is irritating. Will trial loosening PC\"s further  -Left lens he would like more monovision/near power. Increase add by +0.50    2.) Dry Eyes OU  -Corneal surface stable in scleral lens  -Following with Dr. Mina    Order new right lens and mail direct. Follow-up prn     I have confirmed the patient's CC, HPI and reviewed Past Medical History, Past Surgical History, Social History, Family History, Problem List, Medication List and agree with Tech note.     Li Wiggins, OD FAAO FSLS    "

## 2023-10-27 NOTE — NURSING NOTE
Chief Complaints and History of Present Illnesses   Patient presents with    Follow Up     Red and irritated right eye after new contact use. Broke contact after Last exam on 10/2 and then got the replacement in the mail.      Chief Complaint(s) and History of Present Illness(es)       Follow Up              Associated symptoms: dryness and redness.  Negative for eye pain    Treatments tried: artificial tears    Pain scale: 0/10    Comments: Red and irritated right eye after new contact use. Broke contact after Last exam on 10/2 and then got the replacement in the mail.               Comments    After wearing the replacement contact right eye the past few weeks right eye is red and irritated.   Vision seem blurry with right eye and noticing more glare with right eye.   No light sensitivity, or discharge with right eye.     Fay Gonsalez, COT COT 8:11 AM October 27, 2023

## 2023-10-30 ENCOUNTER — OFFICE VISIT (OUTPATIENT)
Dept: OPHTHALMOLOGY | Facility: CLINIC | Age: 48
End: 2023-10-30
Attending: OPHTHALMOLOGY
Payer: COMMERCIAL

## 2023-10-30 DIAGNOSIS — H40.003 GLAUCOMA SUSPECT OF BOTH EYES: ICD-10-CM

## 2023-10-30 DIAGNOSIS — D31.32 CHOROIDAL NEVUS OF LEFT EYE: ICD-10-CM

## 2023-10-30 DIAGNOSIS — H11.823 CONJUNCTIVOCHALASIS OF BOTH EYES: ICD-10-CM
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
## 2023-10-30 DIAGNOSIS — H04.123 DRY EYE SYNDROME OF BOTH EYES: ICD-10-CM

## 2023-10-30 DIAGNOSIS — H10.011 ACUTE FOLLICULAR CONJUNCTIVITIS OF RIGHT EYE: ICD-10-CM

## 2023-10-30 DIAGNOSIS — H59.89 DRY EYE AFTER LASER IN SITU KERATOMILEUSIS: Primary | ICD-10-CM

## 2023-10-30 DIAGNOSIS — H04.129 DRY EYE AFTER LASER IN SITU KERATOMILEUSIS: Primary | ICD-10-CM

## 2023-10-30 PROCEDURE — 99214 OFFICE O/P EST MOD 30 MIN: CPT | Performed by: OPHTHALMOLOGY

## 2023-10-30 PROCEDURE — 99213 OFFICE O/P EST LOW 20 MIN: CPT | Performed by: OPHTHALMOLOGY

## 2023-10-30 RX ORDER — CYCLOSPORINE 0.5 MG/ML
1 EMULSION OPHTHALMIC 2 TIMES DAILY
Qty: 60 EACH | Refills: 11 | Status: SHIPPED | OUTPATIENT
Start: 2023-10-30 | End: 2024-02-14 | Stop reason: ALTCHOICE

## 2023-10-30 ASSESSMENT — VISUAL ACUITY
OD_CC: 20/20
METHOD: SNELLEN - LINEAR
OS_CC+: -1
OS_CC: 20/80
OD_CC+: -1
CORRECTION_TYPE: CONTACTS

## 2023-10-30 ASSESSMENT — TONOMETRY
OD_IOP_MMHG: 19
IOP_METHOD: ICARE
OS_IOP_MMHG: 21

## 2023-10-30 ASSESSMENT — EXTERNAL EXAM - RIGHT EYE: OD_EXAM: ENOPHTHALMOS

## 2023-10-30 ASSESSMENT — EXTERNAL EXAM - LEFT EYE: OS_EXAM: NORMAL

## 2023-10-30 NOTE — PROGRESS NOTES
Chief complaint     Chief Complaints and History of Present Illnesses   Patient presents with    Follow Up     Conjunctivochalasis and PTK referral from Li Wiggins, OD     Interval hx 10/30/23: Eyes have felt pretty dry since end of September. Using ATs 4-6x daily in both eyes. Still wearing scleral lenses and recently saw Dr. Wiggins. Is planning to trial looser fit due to redness and irritation. Ordered new lenses at that visit.      Referring Provider: Dr. 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 Wiggins and Dr. Mcelroy.       Patient initially started to mention a haze over his vision when he saw Dr. Wiggins in February. He notes this haze is more prominent in the left eye rather than the right eye. The haze is worse with transitions. Usually in the peripheral vision. Lasts several hours but doesn't prevent him from driving or seeing well centrally. Doesn't know if it gets better with sunglasses or not. No history of migraines. No clear things that make it better or worse. When initially seen by Dr. Wiggins, concern for vitreous opacities that might be causing symptoms. Seen by Dr. Mcelroy, who did not see a pvd on dilated exam and recommended against yag vitreolysis. Did suggest possibility of PTK to resurface cornea and recommended evaluation with cornea team.       10/30/23 Constant dryness both eyes, especially when its cold out. Pt states VA with scleral lenses is good, and better when using gtts. Recently received a new set of scleral lenses. Occasional light sensitivity.   Per patient, Dr. Wiggins wanting to increase size of current scleral lenses and noticing some conjunctivochalasis (left eye>> right eye) that may make this difficult. Referring to   Keeley for consultation.       Past ocular history   Prior eye surgery/laser/Trauma:   - myopic LASIK 1998  - sinus surgeries (bilatearl (12/2021)   - silent sinus syndrome surgery (right eye) 4/2017  CTL wearer: none  Glasses : yes  Family Hx of eye disease: none    PMH     Past Medical History:   Diagnosis Date    Allergic rhinitis, cause unspecified     Anxiety     Depressive disorder     Diaphragmatic hernia without mention of obstruction or gangrene     Gastro-oesophageal reflux disease     Perforated abdominal viscus 3/10/2015    Reflux esophagitis     Rosacea     Sepsis, unspecified 3/19/2015    Sepsis due to perforated diverticulitis with abscess.     Stricture and stenosis of esophagus        PSH     Past Surgical History:   Procedure Laterality Date    COLECTOMY WITH COLOSTOMY, COMBINED N/A 03/09/2015    Procedure: COMBINED COLECTOMY WITH COLOSTOMY;  Surgeon: Caroline Chong MD;  Location: [facility] OR    CYSTOSCOPY N/A 06/03/2015    Procedure: CYSTOSCOPY;  Surgeon: Alverto Ribeiro MD;  Location: [facility] OR    INSERT STENT URETER Left 06/03/2015    Procedure: INSERT STENT URETER (PRE-OP);  Surgeon: Alverto Ribeiro MD;  Location[facility]    LAPAROSCOPIC ASSISTED COLOSTOMY TAKEDOWN N/A 06/03/2015    Procedure: LAPAROSCOPIC ASSISTED COLOSTOMY TAKEDOWN;  Surgeon: Caroline Chong MD;  Location: [facility] OR    LAPAROTOMY EXPLORATORY N/A 03/09/2015    Procedure: LAPAROTOMY EXPLORATORY;  Surgeon: Caroline Chong MD;  Location: [facility] OR    LASIK  1998    [facility] NONSPECIFIC PROCEDURE      tons       Meds     Current Outpatient Medications   Medication    budesonide (PULMICORT) 0.5 MG/2ML neb solution    budesonide (PULMICORT) 1 MG/2ML neb solution    budesonide (PULMICORT) 1 MG/2ML neb solution    ipratropium (ATROVENT) 0.03 % nasal spray    ipratropium (ATROVENT) 0.06 % nasal spray    phentermine (ADIPEX-P) 37.5 MG tablet    sodium chloride 0.9 % neb solution    sodium chloride 0.9%, bottle, 0.9 % irrigation     VENTOLIN  (90 Base) MCG/ACT inhaler     No current facility-administered medications for this visit.       Drops Currently Taking   AT PRN    Assessment/Plan       #Follicular conjunctivitis right eye  From contact lens vs viral?      # Conjunctivochalasis OU  - mild/moderate conjunctivochalasis in both eyes left eye>> right eye   - defer surgical treatment at this time    # Dry eye syndromeOU  - AT QID PRN  - placed medium eagle punctal plug left lower lid last visit  -Cauterized upper punctum each eye 7/10/23      # h/o LASIK each eye   - mild corneal irregularity, jorge in 2022 does not show ectasia, no active surface issue. Less likely the cause of his symptoms    #Glaucoma suspect:  IOP borderline with large disk 0.5  - Mild increase in IOP each eye previously, stable today  - OCT RNFL and VF are normal  - No need for glaucoma eye drops now  -glaucoma testing is normal    # subjective visual disturbance OS  - noted improvement with scleral lenses, just saw Dr. Wiggins  - Ordered new lenses to trial looser fit    # choroidal nevus  - follows with Dr. Mcmahon    # h/o silent sinus syndrome   - follows with Dr. Anaya/Dr. Neil    Plan:  - visual disturbance is most likely due to dryness   -informed the patient to be off contact lens for now right eye. If symptoms did not improve in the next week patient will contact us and we can send FML  -start restasis each eye for dry eye.  -upper puntcum not functional and lower ducts has plugs.    Follow up:  3 months VT    Follow up with other provider :   Dr. Anaya (ENT)   Dr. Feliciano Robert MD  Resident Physician, PGY-3  Department of Ophthalmology    Attending Physician Attestation:  Complete documentation of historical and exam elements from today's encounter can be found in the full encounter summary report (not reduplicated in this progress note).  I personally obtained the chief complaint(s) and history of present  illness.  I confirmed and edited as necessary the review of systems, past medical/surgical history, family history, social history, and examination findings as documented by others; and I examined the patient myself.  I personally reviewed the relevant tests, images, and reports as documented above.  I formulated and edited as necessary the assessment and plan and discussed the findings and management plan with the patient and family. - Candy Mina MD

## 2023-10-30 NOTE — NURSING NOTE
Chief Complaints and History of Present Illnesses   Patient presents with    Follow Up     3 month cornea follow up     Chief Complaint(s) and History of Present Illness(es)       Follow Up              Associated symptoms: dryness and photophobia.  Negative for eye pain and headache    Treatments tried: eye drops and artificial tears    Pain scale: 0/10    Comments: 3 month cornea follow up              Comments    Constant dryness both eyes, especially when its cold out.   Pt states VA with scleral lenses is good, and better when using gtts.   Recently received a new set of scleral lenses.   Occasional light sensitivity.     Peyman Lynn 7:48 AM October 30, 2023

## 2023-12-01 ENCOUNTER — OFFICE VISIT (OUTPATIENT)
Dept: OPHTHALMOLOGY | Facility: CLINIC | Age: 48
End: 2023-12-01
Payer: COMMERCIAL

## 2023-12-01 DIAGNOSIS — H52.213 IRREGULAR ASTIGMATISM OF BOTH EYES: ICD-10-CM

## 2023-12-01 DIAGNOSIS — H04.123 DRY EYE SYNDROME OF BOTH EYES: Primary | ICD-10-CM

## 2023-12-01 PROCEDURE — 99207 PR NO CHARGE LOS: CPT | Performed by: OPTOMETRIST

## 2023-12-01 ASSESSMENT — REFRACTION_CURRENTRX
OD_AXIS: 135
OD_CYLINDER: -1.00
OS_AXIS: 045
OS_SPHERE: -1.50
OD_BASECURVE: 4.0/7.5
OD_DIAMETER: 14.8
OS_CYLINDER: -1.50
OS_ADDL_SPECS: BOSTON XO BLUE, HYDRAPEG
OS_BASECURVE: 3.9/7.5
OD_SPHERE: -3.00
OS_DIAMETER: 14.8

## 2023-12-01 ASSESSMENT — CONF VISUAL FIELD
OD_SUPERIOR_NASAL_RESTRICTION: 0
METHOD: COUNTING FINGERS
OS_INFERIOR_NASAL_RESTRICTION: 0
OD_NORMAL: 1
OD_INFERIOR_TEMPORAL_RESTRICTION: 0
OD_SUPERIOR_TEMPORAL_RESTRICTION: 0
OS_SUPERIOR_NASAL_RESTRICTION: 0
OS_SUPERIOR_TEMPORAL_RESTRICTION: 0
OD_INFERIOR_NASAL_RESTRICTION: 0
OS_NORMAL: 1
OS_INFERIOR_TEMPORAL_RESTRICTION: 0

## 2023-12-01 ASSESSMENT — VISUAL ACUITY
OS_CC+: -1
OS_PH_CC: 20/40
OD_CC: 20/15
OS_CC: 20/70
METHOD: SNELLEN - LINEAR

## 2023-12-01 ASSESSMENT — EXTERNAL EXAM - RIGHT EYE: OD_EXAM: ENOPHTHALMOS

## 2023-12-01 ASSESSMENT — EXTERNAL EXAM - LEFT EYE: OS_EXAM: NORMAL

## 2023-12-01 NOTE — NURSING NOTE
Chief Complaints and History of Present Illnesses   Patient presents with    Follow Up     Here for Scleral lens follow up     Chief Complaint(s) and History of Present Illness(es)       Follow Up              Associated symptoms: dryness.  Negative for eye pain    Treatments tried: eye drops    Pain scale: 0/10    Comments: Here for Scleral lens follow up              Comments    Pt states he has new replacement lenses each eye.  He tells me vision and comfort with both are good.   Dryness both eyes, gets worse with cold weather.     Pt is using:  Restasis BID each eye   Systane 3-4x daily each eye    Peyman Ho 9:15 AM December 1, 2023

## 2023-12-01 NOTE — PROGRESS NOTES
"Rx check only  A/P  1.) Irregular astigmatism OU  -Likely 2' to ABMD OU  -Symptomatic for blurred vision, not \"crisp\" left eye>right eye  -Excellent objective vision with scleral lens. BCVA 20/15 both eyes  -Doing well in monovision left eye near CL's. Good vision/comfort/fit today. No lens adjustments recommended.     2.) Dry Eyes OU  -Corneal surface stable in scleral lens  -Following with Dr. Mina    Continue with current lenses. No changes recommended. Follow-up annually    I have confirmed the patient's CC, HPI and reviewed Past Medical History, Past Surgical History, Social History, Family History, Problem List, Medication List and agree with Tech note.     Li Wiggins, JEREMÍAS WISEO FSLS    "

## 2024-01-10 DIAGNOSIS — H04.123 DRY EYES: ICD-10-CM

## 2024-01-11 RX ORDER — SODIUM CHLORIDE FOR INHALATION 0.9 %
3 VIAL, NEBULIZER (ML) INHALATION 2 TIMES DAILY
Qty: 300 ML | Refills: 4 | Status: SHIPPED | OUTPATIENT
Start: 2024-01-11 | End: 2024-07-24

## 2024-02-13 ENCOUNTER — TELEPHONE (OUTPATIENT)
Dept: OPHTHALMOLOGY | Facility: CLINIC | Age: 49
End: 2024-02-13
Payer: COMMERCIAL

## 2024-02-13 DIAGNOSIS — H04.123 DRY EYE SYNDROME OF BOTH EYES: Primary | ICD-10-CM

## 2024-02-13 NOTE — TELEPHONE ENCOUNTER
Rx for Xiidra sent as alternative to Restasis per pt request/cost     Called and spoke to Munir     Rx was sent to his pharmacy at [facility] on 2/13/2024 at 2:17 PM

## 2024-02-13 NOTE — TELEPHONE ENCOUNTER
[facility] Call Center    Phone Message    May a detailed message be left on voicemail: yes     Reason for Call: Medication Question or concern regarding medication   Prescription Clarification  Name of Medication: RESTATIS  Prescribing Provider:     Pharmacy:   [facility] [address] [facility] [address]      What on the order needs clarification? PT CALLED AND WANTED TO KNOW IF HE WAS ABLE TO CHANGE HIS RESTASIS TO XIIDRA . STATES THAT THE RESTASIS IS TOO EXPENSIVE. PLEASE REVIEW AND CONTACT PT TO DISCUSS. THANK YOU       Action Taken: Message routed to:  [facility] EYE    Travel Screening: Not Applicable

## 2024-02-13 NOTE — TELEPHONE ENCOUNTER
Prior Authorization Not Needed per Insurance    Medication: LIFITEGRAST 5 % OP SOLN  Insurance Company: [employer] - Phone [phone] Fax [phone]  Expected CoPay: $    Pharmacy Filling the Rx:  [facility] [address] [place] [address] [address] [facility] [[id number]]   Pharmacy Notified: Yes   Patient Notified: Yes

## 2024-02-13 NOTE — TELEPHONE ENCOUNTER
[facility] Call Center    Phone Message    May a detailed message be left on voicemail: yes     Reason for Call: Medication Question or concern regarding medication   Prescription Clarification  Name of Medication: lifitegrast (XIIDRA) 5 % opthalmic solution   Prescribing Provider:    Pharmacy: [facility] [facility] [address] [facility]    What on the order needs clarification? PT STATES THAT THEY HAVE CONTACTED THE PHARMACY AND STATES THAT HE WAS TOLD THAT PA IS NEEDED FOR THIS MEDICATION LISTED ABOVE. BUT PHARMACY STATES THAT PA WAS DECLINED BY THE PROVDER. PLEASE REVIEW AND CONTACT PT TO DISCUSS. THANK YOU       Action Taken: Message routed to:  [facility] EYE    Travel Screening: Not Applicable

## 2024-02-13 NOTE — TELEPHONE ENCOUNTER
Retail Pharmacy Prior Authorization Team   Phone: [phone]    Note: Due to record-high volumes, our turn-around is time taking longer than normal . We are currently 10 days behind in the pools.   We are working diligently to submit all requests in a timely manner and in the order they are received. Please only flag true urgent requests as high priority to the pool at this time.   If you have questions - please send a note/message in the active PA encounter and send back to the RPPA (Retail Pharmacy Prior Authorization) team [[id number]].    If you have more specific questions about our process please reach out to our supervisor Ruchi Eisenberg.   Thank you!

## 2024-02-13 NOTE — TELEPHONE ENCOUNTER
Rx for Xiidra sent as alternative to Restasis per pt request/cost    Note to pt communicator to update pt Rx sent    Mj Lyons RN 1:49 PM 02/13/24

## 2024-02-14 NOTE — TELEPHONE ENCOUNTER
Spoke to pt at 0820 and reviewed no PA needed per our PA team from yesterday.    Provided information in [employer] message and asked pt to contact pharmacy to ensure insurance information up to date and if still getting rejection/not able to obtain to message myself back.    Pt seemed comfortable with information/plan.    Mj Lyons RN 8:26 AM 02/14/24

## 2024-04-08 DIAGNOSIS — H04.123 DRY EYE SYNDROME OF BOTH EYES: ICD-10-CM

## 2024-05-03 DIAGNOSIS — H04.123 DRY EYE SYNDROME OF BOTH EYES: ICD-10-CM

## 2024-05-03 NOTE — TELEPHONE ENCOUNTER
lifitegrast (XIIDRA) 5 % opthalmic solution       Last Written Prescription Date:  2/13/24  Last Fill Quantity: 60,   # refills: 3   Dup-last rx:2-13-24 for 60each w/3rf, spoke w/ pharm

## 2024-05-09 ENCOUNTER — TELEPHONE (OUTPATIENT)
Dept: OPHTHALMOLOGY | Facility: CLINIC | Age: 49
End: 2024-05-09
Payer: COMMERCIAL

## 2024-05-09 DIAGNOSIS — H04.123 DRY EYE SYNDROME OF BOTH EYES: ICD-10-CM

## 2024-05-09 NOTE — TELEPHONE ENCOUNTER
[facility] Call Center    Phone Message    May a detailed message be left on voicemail: yes     Reason for Call: Medication Refill Request    Has the patient contacted the pharmacy for the refill? Yes   Name of medication being requested: lifitegrast (XIIDRA) 5 % opthalmic solution  Provider who prescribed the medication: Dr Mina  Pharmacy: [facility] [address] [facility] [address]  Date medication is needed: ASAP     Patient states pharmacy sent us multiple request with no response and patient is almost out of medication. Patient would like a call back at [phone]. Thank you.    Action Taken: Message routed to:  Other: Med Refill    Travel Screening: Not Applicable

## 2024-05-09 NOTE — TELEPHONE ENCOUNTER
lifitegrast (XIIDRA) 5 % opthalmic solution     Last Written Prescription Date: 2/13/24  Last Fill Quantity: 60, # refills: 3    Last Office Visit :  12/1/23   Plan:  - visual disturbance is most likely due to dryness   -informed the patient to be off contact lens for now right eye. If symptoms did not improve in the next week patient will contact us and we can send FML  -start restasis each eye for dry eye.  -upper puntcum not functional and lower ducts has plugs.  Next Office Visit: 5/17/24     RF per protocol.          Provider who prescribed the medication: Dr Mina  Pharmacy: [facility] [address] [facility] [address]  Date medication is needed: ASAP                Patient states pharmacy sent us multiple request with no response and patient is almost out of medication. Patient would like a call back at [phone]. Thank you.

## 2024-05-17 ENCOUNTER — OFFICE VISIT (OUTPATIENT)
Dept: OPHTHALMOLOGY | Facility: CLINIC | Age: 49
End: 2024-05-17
Attending: OPHTHALMOLOGY
Payer: COMMERCIAL

## 2024-05-17 DIAGNOSIS — H40.003 GLAUCOMA SUSPECT OF BOTH EYES: ICD-10-CM

## 2024-05-17 DIAGNOSIS — H04.123 DRY EYE SYNDROME OF BOTH EYES: Primary | ICD-10-CM

## 2024-05-17 DIAGNOSIS — H53.9 VISUAL DISTURBANCE: ICD-10-CM

## 2024-05-17 DIAGNOSIS — H11.823 CONJUNCTIVOCHALASIS OF BOTH EYES: ICD-10-CM

## 2024-05-17 PROCEDURE — 99213 OFFICE O/P EST LOW 20 MIN: CPT | Performed by: OPHTHALMOLOGY

## 2024-05-17 ASSESSMENT — VISUAL ACUITY
OD_SC+: +2
OD_SC: 20/50
METHOD: SNELLEN - LINEAR
OS_SC+: +2
OS_SC: 20/40
OS_PH_SC: 20/25
OD_PH_SC: 20/30

## 2024-05-17 ASSESSMENT — TONOMETRY
IOP_METHOD: ICARE
OS_IOP_MMHG: 19
OD_IOP_MMHG: 18

## 2024-05-17 ASSESSMENT — EXTERNAL EXAM - RIGHT EYE: OD_EXAM: ENOPHTHALMOS

## 2024-05-17 ASSESSMENT — EXTERNAL EXAM - LEFT EYE: OS_EXAM: NORMAL

## 2024-05-17 NOTE — NURSING NOTE
Chief Complaints and History of Present Illnesses   Patient presents with    Follow Up     Dry eye syndrome of both eyes      Chief Complaint(s) and History of Present Illness(es)       Follow Up              Comments: Dry eye syndrome of both eyes               Comments    States the dryness is better with the Xiidra negative  Pt states no change in VA since last visitno flashes or floaters   No eye pain    Aleksandra Mcelroy COT 2:03 PM May 17, 2024

## 2024-05-17 NOTE — PROGRESS NOTES
Chief complaint     Chief Complaints and History of Present Illnesses   Patient presents with    Follow Up     Conjunctivochalasis and PTK referral from Li Wiggins, OD     Interval hx 05/17/2024  Chief Complaint(s) and History of Present Illness(es)       Follow Up     Additional comments: Dry eye syndrome of both eyes              Comments    States the dryness is better with the Xiidra negative  Pt states no change in VA since last visitno flashes or floaters   No eye pain    Aleksandra Mcelroy COT 2:03 PM May 17, 2024                                Referring Provider: Dr. 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 Wiggins and Dr. Mcelroy.       Patient initially started to mention a haze over his vision when he saw Dr. Wiggins in February. He notes this haze is more prominent in the left eye rather than the right eye. The haze is worse with transitions. Usually in the peripheral vision. Lasts several hours but doesn't prevent him from driving or seeing well centrally. Doesn't know if it gets better with sunglasses or not. No history of migraines. No clear things that make it better or worse. When initially seen by Dr. Wiggins, concern for vitreous opacities that might be causing symptoms. Seen by Dr. Mcelroy, who did not see a pvd on dilated exam and recommended against yag vitreolysis. Did suggest possibility of PTK to resurface cornea and recommended evaluation with cornea team.       10/30/23 Constant dryness both eyes, especially when its cold out. Pt states VA with scleral lenses is good, and better when using gtts. Recently received a new set of scleral lenses. Occasional light sensitivity.   Per patient, Dr. Wiggins wanting to increase size of current scleral lenses  and noticing some conjunctivochalasis (left eye>> right eye) that may make this difficult. Referring to Dr. Mina for consultation.       Past ocular history   Prior eye surgery/laser/Trauma:   - myopic LASIK 1998  - sinus surgeries (bilatearl (12/2021)   - silent sinus syndrome surgery (right eye) 4/2017  CTL wearer: none  Glasses : yes  Family Hx of eye disease: none    PMH     Past Medical History:   Diagnosis Date    Allergic rhinitis, cause unspecified     Anxiety     Depressive disorder     Diaphragmatic hernia without mention of obstruction or gangrene     Gastro-oesophageal reflux disease     Perforated abdominal viscus 3/10/2015    Reflux esophagitis     Rosacea     Sepsis, unspecified 3/19/2015    Sepsis due to perforated diverticulitis with abscess.     Stricture and stenosis of esophagus        PSH     Past Surgical History:   Procedure Laterality Date    COLECTOMY WITH COLOSTOMY, COMBINED N/A 03/09/2015    Procedure: COMBINED COLECTOMY WITH COLOSTOMY;  Surgeon: Caroline Chong MD;  Location: [facility] OR    CYSTOSCOPY N/A 06/03/2015    Procedure: CYSTOSCOPY;  Surgeon: Alverto Ribeiro MD;  Location:  OR    INSERT STENT URETER Left 06/03/2015    Procedure: INSERT STENT URETER (PRE-OP);  Surgeon: Alverto Ribeiro MD;  Location: [facility] OR    LAPAROSCOPIC ASSISTED COLOSTOMY TAKEDOWN N/A 06/03/2015    Procedure: LAPAROSCOPIC ASSISTED COLOSTOMY TAKEDOWN;  Surgeon: Caroline Chong MD;  Location: [facility] OR    LAPAROTOMY EXPLORATORY N/A 03/09/2015    Procedure: LAPAROTOMY EXPLORATORY;  Surgeon: Caroline Chong MD;  Location:  OR    LASIK  1998    [facility] NONSPECIFIC PROCEDURE      tons       Meds     Current Outpatient Medications   Medication Sig Dispense Refill    budesonide (PULMICORT) 0.5 MG/2ML neb solution Empty contents of ampule into 240mL of saline solution and rinse both nasal cavities as instructed twice daily. 120 mL 6    budesonide (PULMICORT) 1 MG/2ML neb solution Empty contents of  ampule into 240mL of saline solution and rinse both nasal cavities as instructed twice daily. 180 mL 0    budesonide (PULMICORT) 1 MG/2ML neb solution       ipratropium (ATROVENT) 0.03 % nasal spray Spray 2 sprays into both nostrils 3 times daily 30 mL 6    ipratropium (ATROVENT) 0.06 % nasal spray Spray 2 sprays into both nostrils 3 times daily 15 mL 3    lifitegrast (XIIDRA) 5 % opthalmic solution Place 1 drop into both eyes 2 times daily 60 each 3    phentermine (ADIPEX-P) 37.5 MG tablet       sodium chloride 0.9 % neb solution 3 mLs by Other route 2 times daily 100 count box of 3ml vials. For use as directed with medically necessary contact lenses. No nebulizer needed. Needs 2 vials per day. 300 mL 4    sodium chloride 0.9%, bottle, 0.9 % irrigation Mix 240mL of saline solution with ampule of budesonide in the Neilmed bottle and rinse both nasal cavities as instructed twice daily. 35528 mL 0    VENTOLIN  (90 Base) MCG/ACT inhaler as needed       No current facility-administered medications for this visit.       Drops Currently Taking   AT PRN  Xiidra BID    Assessment/Plan   # Conjunctivochalasis OU  - mild/moderate conjunctivochalasis in both eyes left eye>> right eye   - defer surgical treatment at this time    # Dry eye syndromeOU  - AT QID PRN  - placed medium eagle punctal plug left lower lid last visit  -Cauterized upper punctum each eye 7/10/23  On Xiidra    # h/o LASIK each eye   - mild corneal irregularity, jorge in 2022 does not show ectasia, no active surface issue. Less likely the cause of his symptoms    #Glaucoma suspect:  IOP borderline with large disk 0.5  - Mild increase in IOP each eye previously, stable today  - OCT RNFL and VF are normal  - No need for glaucoma eye drops now  -glaucoma testing is normal    # subjective visual disturbance OS  -improved with drops and CL    # choroidal nevus  - follows with Dr. Mcmahon    # h/o silent sinus syndrome   - follows with Dr. Anaya/  Rashaad    Plan:  - visual disturbance is most likely due to dryness   -continue xiidra each eye for dry eye.  -upper puntcum not functional and lower ducts has plugs.  Patient is using CL. Ok to continue  Recommend warm compresses  Can consider tearcare in the future      Follow up:  6 months VT    Follow up with other provider :   Dr. Anaya (ENT)   Dr. Feliciano Mcmahon     Attending Physician Attestation:  Complete documentation of historical and exam elements from today's encounter can be found in the full encounter summary report (not reduplicated in this progress note).  I personally obtained the chief complaint(s) and history of present illness.  I confirmed and edited as necessary the review of systems, past medical/surgical history, family history, social history, and examination findings as documented by others; and I examined the patient myself.  I personally reviewed the relevant tests, images, and reports as documented above.  I formulated and edited as necessary the assessment and plan and discussed the findings and management plan with the patient and family. - Candy Mina MD

## 2024-06-22 ENCOUNTER — HEALTH MAINTENANCE LETTER (OUTPATIENT)
Age: 49
End: 2024-06-22

## 2024-07-16 DIAGNOSIS — H04.123 DRY EYE SYNDROME OF BOTH EYES: Primary | ICD-10-CM

## 2024-09-28 DIAGNOSIS — H04.123 DRY EYE SYNDROME OF BOTH EYES: ICD-10-CM

## 2024-10-07 RX ORDER — LIFITEGRAST 50 MG/ML
1 SOLUTION/ DROPS OPHTHALMIC 2 TIMES DAILY
Qty: 60 EACH | Refills: 5 | Status: SHIPPED | OUTPATIENT
Start: 2024-10-07

## 2024-10-07 NOTE — TELEPHONE ENCOUNTER
Last visit in May 2024 with recommendation to follow up in 6 months.    Rx sent    Note to  to assist in next available scheduling with Dr. Mina.    Mj Lyons RN 12:06 PM 10/07/24

## 2024-10-07 NOTE — TELEPHONE ENCOUNTER
Medication: Xiidra 5 % Ophthalmic Solution     Requested directions:  INSTILL 1 DROP INTO EACH EYE TWICE DAILY  Current directions on the medication list: same    Last Written Prescription Date:  7-16-24  Last Fill Quantity: 60,   # refills: 3    Last Office Visit: 5-17-24  Future Office visit: none    Attending Provider: Eddie  Last Clinic Note:   -continue xiidra each eye for dry eye.   Follow up:  6 months VT      Routing refill request to provider for review/approval because:  Not on protocol  Requires provider review

## 2024-10-30 ENCOUNTER — OFFICE VISIT (OUTPATIENT)
Dept: OPHTHALMOLOGY | Facility: CLINIC | Age: 49
End: 2024-10-30
Attending: OPHTHALMOLOGY
Payer: COMMERCIAL

## 2024-10-30 DIAGNOSIS — H40.003 GLAUCOMA SUSPECT OF BOTH EYES: ICD-10-CM

## 2024-10-30 DIAGNOSIS — H04.123 DRY EYE SYNDROME OF BOTH EYES: Primary | ICD-10-CM

## 2024-10-30 DIAGNOSIS — H01.01B ULCERATIVE BLEPHARITIS OF UPPER AND LOWER EYELIDS OF BOTH EYES: ICD-10-CM

## 2024-10-30 DIAGNOSIS — H01.01A ULCERATIVE BLEPHARITIS OF UPPER AND LOWER EYELIDS OF BOTH EYES: ICD-10-CM

## 2024-10-30 DIAGNOSIS — H11.823 CONJUNCTIVOCHALASIS OF BOTH EYES: ICD-10-CM

## 2024-10-30 PROCEDURE — 99214 OFFICE O/P EST MOD 30 MIN: CPT | Performed by: OPHTHALMOLOGY

## 2024-10-30 PROCEDURE — 99214 OFFICE O/P EST MOD 30 MIN: CPT | Mod: 25 | Performed by: OPHTHALMOLOGY

## 2024-10-30 PROCEDURE — 68760 CLOSE TEAR DUCT OPENING: CPT | Performed by: OPHTHALMOLOGY

## 2024-10-30 ASSESSMENT — VISUAL ACUITY
METHOD: SNELLEN - LINEAR
OS_PH_CC: 20/40
OS_CC+: -1
OD_CC: 20/15
OD_CC+: -1
OS_CC: 20/80
OS_PH_CC+: +2
CORRECTION_TYPE: CONTACTS

## 2024-10-30 ASSESSMENT — CONF VISUAL FIELD
OS_SUPERIOR_TEMPORAL_RESTRICTION: 0
OD_NORMAL: 1
OS_SUPERIOR_NASAL_RESTRICTION: 0
OD_INFERIOR_TEMPORAL_RESTRICTION: 0
METHOD: COUNTING FINGERS
OD_SUPERIOR_NASAL_RESTRICTION: 0
OS_INFERIOR_NASAL_RESTRICTION: 0
OD_INFERIOR_NASAL_RESTRICTION: 0
OD_SUPERIOR_TEMPORAL_RESTRICTION: 0
OS_NORMAL: 1
OS_INFERIOR_TEMPORAL_RESTRICTION: 0

## 2024-10-30 ASSESSMENT — REFRACTION_WEARINGRX
OD_ADD: +1.50
OD_SPHERE: -1.00
OS_AXIS: 010
OD_CYLINDER: +0.50
OS_SPHERE: -0.75
OD_AXIS: 145
OS_ADD: +1.50
OS_CYLINDER: +0.75

## 2024-10-30 ASSESSMENT — TONOMETRY
OD_IOP_MMHG: 20
IOP_METHOD: ICARE
OS_IOP_MMHG: 21

## 2024-10-30 ASSESSMENT — EXTERNAL EXAM - LEFT EYE: OS_EXAM: NORMAL

## 2024-10-30 ASSESSMENT — EXTERNAL EXAM - RIGHT EYE: OD_EXAM: ENOPHTHALMOS

## 2024-10-30 NOTE — PROGRESS NOTES
Chief complaint     Chief Complaints and History of Present Illnesses   Patient presents with    Follow Up     Conjunctivochalasis and PTK referral from Li Wiggins, OD     Interval hx 10/30/2024  Chief Complaint(s) and History of Present Illness(es)       Follow Up     Additional comments: 5 month follow up for Conjunctivochalasis OU             Comments    Pt state that both eyes feel much drier over the past 2 years, better with drops. No eye pain today. No flashes or floaters.  No DM.    Leylaerintyrell SterlingANASTASIIA October 30, 2024 9:42 AM                           Referring Provider: Dr. 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 Wiggins and Dr. Mcelroy.       Patient initially started to mention a haze over his vision when he saw Dr. Wiggins in February. He notes this haze is more prominent in the left eye rather than the right eye. The haze is worse with transitions. Usually in the peripheral vision. Lasts several hours but doesn't prevent him from driving or seeing well centrally. Doesn't know if it gets better with sunglasses or not. No history of migraines. No clear things that make it better or worse. When initially seen by Dr. Wiggins, concern for vitreous opacities that might be causing symptoms. Seen by Dr. Mcelroy, who did not see a pvd on dilated exam and recommended against yag vitreolysis. Did suggest possibility of PTK to resurface cornea and recommended evaluation with cornea team.       10/30/23 Constant dryness both eyes, especially when its cold out. Pt states VA with scleral lenses is good, and better when using gtts. Recently received a new set of scleral lenses. Occasional light sensitivity.   Per patient, Dr. Wiggins wanting to increase size of  current scleral lenses and noticing some conjunctivochalasis (left eye>> right eye) that may make this difficult. Referring to Dr. Mina for consultation.       Past ocular history   Prior eye surgery/laser/Trauma:   - myopic LASIK 1998  - sinus surgeries (bilatearl (12/2021)   - silent sinus syndrome surgery (right eye) 4/2017  CTL wearer: none  Glasses : yes  Family Hx of eye disease: none    PMH     Past Medical History:   Diagnosis Date    Allergic rhinitis, cause unspecified     Anxiety     Depressive disorder     Diaphragmatic hernia without mention of obstruction or gangrene     Gastro-oesophageal reflux disease     Perforated abdominal viscus 3/10/2015    Reflux esophagitis     Rosacea     Sepsis, unspecified 3/19/2015    Sepsis due to perforated diverticulitis with abscess.     Stricture and stenosis of esophagus        PSH     Past Surgical History:   Procedure Laterality Date    COLECTOMY WITH COLOSTOMY, COMBINED N/A 03/09/2015    Procedure: COMBINED COLECTOMY WITH COLOSTOMY;  Surgeon: Caroline Chong MD;  Location: [facility] OR    CYSTOSCOPY N/A 06/03/2015    Procedure: CYSTOSCOPY;  Surgeon: Alverto Ribeiro MD;  Location: [facility] OR    INSERT STENT URETER Left 06/03/2015    Procedure: INSERT STENT URETER (PRE-OP);  Surgeon: Alverto Ribeiro MD;  Location: [facility] OR    LAPAROSCOPIC ASSISTED COLOSTOMY TAKEDOWN N/A 06/03/2015    Procedure: LAPAROSCOPIC ASSISTED COLOSTOMY TAKEDOWN;  Surgeon: Caroline Chong MD;  Location: [facility] OR    LAPAROTOMY EXPLORATORY N/A 03/09/2015    Procedure: LAPAROTOMY EXPLORATORY;  Surgeon: Caroline Chong MD;  Location: [facility] OR    LASIK  1998    [facility] NONSPECIFIC PROCEDURE      tons       Meds     Current Outpatient Medications   Medication Sig Dispense Refill    lifitegrast (XIIDRA) 5 % opthalmic solution Place 1 drop into both eyes 2 times daily. 60 each 5    sodium chloride 0.9 % neb solution 3 mLs by Other route 2 times daily 100 count box of 3ml vials. For use as  directed with medically necessary contact lenses. No nebulizer needed. Needs 2 vials per day. 300 mL 11    VENTOLIN  (90 Base) MCG/ACT inhaler as needed      budesonide (PULMICORT) 0.5 MG/2ML neb solution Empty contents of ampule into 240mL of saline solution and rinse both nasal cavities as instructed twice daily. (Patient not taking: Reported on 10/30/2024) 120 mL 6    ipratropium (ATROVENT) 0.03 % nasal spray Spray 2 sprays into both nostrils 3 times daily 30 mL 6    ipratropium (ATROVENT) 0.06 % nasal spray Spray 2 sprays into both nostrils 3 times daily 15 mL 3     No current facility-administered medications for this visit.       Drops Currently Taking   AT PRN  Xiidra BID    Assessment/Plan   # Conjunctivochalasis OU  - mild/moderate conjunctivochalasis in both eyes left eye>> right eye   - defer surgical treatment at this time    # Dry eye syndromeOU  - AT QID PRN  - placed medium eagle punctal plug left lower lid last visit  -Cauterized upper punctum each eye 7/10/23  -On Xiidra    #blepharitis upper and lower lid each eye  Significant thick secretions.      # h/o LASIK each eye   - mild corneal irregularity, jorge in 2022 does not show ectasia, no active surface issue. Less likely the cause of his symptoms    #Glaucoma suspect:  IOP borderline with large disk 0.5  - Mild increase in IOP each eye previously, stable today  - OCT RNFL and VF are normal  - No need for glaucoma eye drops now  -glaucoma testing is normal    # subjective visual disturbance OS  -improved with drops and CL    # choroidal nevus  - follows with Dr. Mcmahon    # h/o silent sinus syndrome   - follows with Dr. Anaya/Dr. Neil    Plan:  R/B/A of lower duct punctal cauter discussed.patient agree to proceed today  Discussed tearcare to help with the blepharitis patient agree to proceed.  - visual disturbance is most likely due to dryness   -continue xiidra each eye for dry eye.  -upper puntcum not functional  -lower plugs are not  in place and could be contributing to symptoms. Plugs removed in clinic  Patient is using CL. Ok to continue  Recommend warm compresses  Can consider tearcare in the future      Follow up:  Next available for tear care    Follow up with other provider :   Dr. Anaya (ENT)   Dr. Feliciano Mcmahon     Attending Physician Attestation:  Complete documentation of historical and exam elements from today's encounter can be found in the full encounter summary report (not reduplicated in this progress note).  I personally obtained the chief complaint(s) and history of present illness.  I confirmed and edited as necessary the review of systems, past medical/surgical history, family history, social history, and examination findings as documented by others; and I examined the patient myself.  I personally reviewed the relevant tests, images, and reports as documented above.  I formulated and edited as necessary the assessment and plan and discussed the findings and management plan with the patient and family. - Candy Mina MD

## 2024-10-30 NOTE — NURSING NOTE
Chief Complaints and History of Present Illnesses   Patient presents with    Follow Up     5 month follow up for Conjunctivochalasis OU     Chief Complaint(s) and History of Present Illness(es)       Follow Up              Comments: 5 month follow up for Conjunctivochalasis OU              Comments    Pt state that both eyes feel much drier over the past 2 years, better with drops. No eye pain today. No flashes or floaters.  No DM.    ANASTASIIA Robbins October 30, 2024 9:42 AM

## 2024-11-15 ENCOUNTER — OFFICE VISIT (OUTPATIENT)
Dept: OPHTHALMOLOGY | Facility: CLINIC | Age: 49
End: 2024-11-15
Attending: OPHTHALMOLOGY
Payer: COMMERCIAL

## 2024-11-15 DIAGNOSIS — H01.01B ULCERATIVE BLEPHARITIS OF UPPER AND LOWER EYELIDS OF BOTH EYES: ICD-10-CM

## 2024-11-15 DIAGNOSIS — H04.123 DRY EYE SYNDROME OF BOTH EYES: Primary | ICD-10-CM

## 2024-11-15 DIAGNOSIS — H01.01A ULCERATIVE BLEPHARITIS OF UPPER AND LOWER EYELIDS OF BOTH EYES: ICD-10-CM

## 2024-11-15 NOTE — PROGRESS NOTES
Procedure visit  Procedure note:    Diagnosis: dry eye and blepharitis each eye    Procedure: Tearcare each eye     Surgeon: candy valverde    Description:    Tearcare heating pad was applied on the upper and lowe lids for 15 minutes  Then manual expression of meibomian gland secretions was performed at slit lamp.  Patient instructed to continue Dry eye treatment and warm compresses at home and to follow in 6 months    Attending Physician Attestation:  Complete documentation of historical and exam elements from today's encounter can be found in the full encounter summary report (not reduplicated in this progress note).  I personally obtained the chief complaint(s) and history of present illness.  I confirmed and edited as necessary the review of systems, past medical/surgical history, family history, social history, and examination findings as documented by others; and I examined the patient myself.  I personally reviewed the relevant tests, images, and reports as documented above.  I formulated and edited as necessary the assessment and plan and discussed the findings and management plan with the patient and family. - Candy Valverde MD

## 2025-03-10 ENCOUNTER — OFFICE VISIT (OUTPATIENT)
Dept: OTOLARYNGOLOGY | Facility: CLINIC | Age: 50
End: 2025-03-10
Payer: COMMERCIAL

## 2025-03-10 VITALS
DIASTOLIC BLOOD PRESSURE: 93 MMHG | SYSTOLIC BLOOD PRESSURE: 129 MMHG | OXYGEN SATURATION: 99 % | WEIGHT: 259.1 LBS | HEIGHT: 70 IN | BODY MASS INDEX: 37.09 KG/M2 | HEART RATE: 81 BPM

## 2025-03-10 DIAGNOSIS — J32.0 CHRONIC MAXILLARY SINUSITIS: Primary | ICD-10-CM

## 2025-03-10 DIAGNOSIS — J34.89 SILENT SINUS SYNDROME: ICD-10-CM

## 2025-03-10 RX ORDER — PREDNISONE 10 MG/1
TABLET ORAL
COMMUNITY
Start: 2024-12-30

## 2025-03-10 RX ORDER — BENZONATATE 200 MG/1
CAPSULE ORAL
COMMUNITY
Start: 2024-12-27

## 2025-03-10 RX ORDER — SEMAGLUTIDE 2.68 MG/ML
INJECTION, SOLUTION SUBCUTANEOUS
COMMUNITY
Start: 2024-11-14

## 2025-03-10 RX ORDER — FLUTICASONE PROPIONATE AND SALMETEROL 100; 50 UG/1; UG/1
POWDER RESPIRATORY (INHALATION)
COMMUNITY
Start: 2024-12-31

## 2025-03-10 RX ORDER — AZITHROMYCIN 250 MG/1
TABLET, FILM COATED ORAL
COMMUNITY
Start: 2024-12-27

## 2025-03-10 RX ORDER — OMEPRAZOLE 20 MG/1
20-40 CAPSULE, DELAYED RELEASE ORAL
COMMUNITY
Start: 2024-01-02

## 2025-03-10 RX ORDER — ROSUVASTATIN CALCIUM 20 MG/1
TABLET, COATED ORAL
COMMUNITY
Start: 2024-12-13

## 2025-03-10 ASSESSMENT — PAIN SCALES - GENERAL: PAINLEVEL_OUTOF10: MODERATE PAIN (4)

## 2025-03-10 NOTE — LETTER
3/10/2025       RE: Iraj Ghosh  [address] [place] [address]     Dear Colleague,    Thank you for referring your patient, Iraj Ghosh, to the [facility]. Please see a copy of my visit note below.      [facility] [address]  4[address] FLOOR  [facility] [address]  Phone: [phone]  Fax: [phone]    Patient:  Iraj Ghosh, Date of birth 1975  Date of Visit:  03/10/2025  Referring Provider Referred Self      Assessment & Plan     Prolonged sinus issues post-cold  - Prolonged sinus infections likely due to multiple surgeries and scarring. Sinuses likely dysfunctional long term as evidenced by prior history of silent sinus syndrome.  - Order a new CAT scan to assess current sinus condition. Initiate gentamicin antibiotic irrigation. Discontinue budesonide as no polyps are present. If CT scan shows concerning findings, consider oral antibiotics or further surgery. For future colds, if symptoms worsen after 7 days or do not improve after 10 days, consider bacterial infection and request antibiotics.      20 minutes spent by me on the date of the encounter doing chart review, history and exam, documentation and further activities per the note. This time is in addition to separately billable procedure.         History of Present Illness    Pertinent history obtain from: chart review and patient    - Munir Ghosh, 49-year-old male.  - History of surgery for silent sinus syndrome.  - Symptoms worsen when sick, with prolonged recovery.  - Experienced constant headaches and dental pain.  - Noted small amounts of blood from the nose for the past month and a half.  - Became ill in December 2024 in [place]; treated with antibiotics and steroids, which provided some relief.  - Underwent a CT scan in 2022 to assess eye socket shape, showing mucus  "in the sinus.  - Regular saline irrigation performed daily.  - Previously used budesonide for irrigation, but has since stopped.    Physical Exam    Vital signs:  BP (!) 129/93 (BP Location: Right arm, Patient Position: Sitting, Cuff Size: Adult Large)   Pulse 81   Ht 1.765 m (5' 9.5\")   Wt 117.5 kg (259 lb 1.6 oz)   SpO2 99%   BMI 37.71 kg/m      HEENT:     Procedure:  Endoscopy indicated for sinus exam  Topical anesthetic/decongestant spray applied.  Rigid scope used for visualization of middle and posterior meatus, nasal cavity.  Findings:   Examination revealed mucus discharge from the left sinus. No obstruction noted toward the frontal sinus. Suction used to clean out the right side of the nose. No abnormalities on the right side. No polyps observed.             Consent was obtained from the patient to use an AI documentation tool in the creation of  this note      Again, thank you for allowing me to participate in the care of your patient.      Sincerely,    Louisa Jiménez MD    "

## 2025-03-10 NOTE — PROGRESS NOTES
"[facility] [address] [address]  Phone: [phone]  Fax: [phone]    Patient:  Iraj Ghosh, Date of birth 1975  Date of Visit:  03/10/2025  Referring Provider Referred Self      Assessment & Plan      Prolonged sinus issues post-cold  - Prolonged sinus infections likely due to multiple surgeries and scarring. Sinuses likely dysfunctional long term as evidenced by prior history of silent sinus syndrome.  - Order a new CAT scan to assess current sinus condition. Initiate gentamicin antibiotic irrigation. Discontinue budesonide as no polyps are present. If CT scan shows concerning findings, consider oral antibiotics or further surgery. For future colds, if symptoms worsen after 7 days or do not improve after 10 days, consider bacterial infection and request antibiotics.      20 minutes spent by me on the date of the encounter doing chart review, history and exam, documentation and further activities per the note. This time is in addition to separately billable procedure.         History of Present Illness     Pertinent history obtain from: chart review and patient    - Munir Ghosh, 49-year-old male.  - History of surgery for silent sinus syndrome.  - Symptoms worsen when sick, with prolonged recovery.  - Experienced constant headaches and dental pain.  - Noted small amounts of blood from the nose for the past month and a half.  - Became ill in December 2024 in [place]; treated with antibiotics and steroids, which provided some relief.  - Underwent a CT scan in 2022 to assess eye socket shape, showing mucus in the sinus.  - Regular saline irrigation performed daily.  - Previously used budesonide for irrigation, but has since stopped.    Physical Exam     Vital signs:  BP (!) 129/93 (BP Location: Right arm, Patient Position: Sitting, Cuff Size: Adult Large)   Pulse 81   Ht 1.765 m (5' 9.5\")   Wt 117.5 kg (259 lb 1.6 " oz)   SpO2 99%   BMI 37.71 kg/m      HEENT:     Procedure:  Endoscopy indicated for sinus exam  Topical anesthetic/decongestant spray applied.  Rigid scope used for visualization of middle and posterior meatus, nasal cavity.  Findings:   Examination revealed mucus discharge from the left sinus. No obstruction noted toward the frontal sinus. Suction used to clean out the right side of the nose. No abnormalities on the right side. No polyps observed.             Consent was obtained from the patient to use an AI documentation tool in the creation of  this note

## 2025-03-10 NOTE — NURSING NOTE
"Chief Complaint   Patient presents with    RECHECK   Blood pressure (!) 129/93, pulse 81, height 1.765 m (5' 9.5\"), weight 117.5 kg (259 lb 1.6 oz), SpO2 99%. Hernán Christianson, EMT    "

## 2025-03-17 ENCOUNTER — ANCILLARY PROCEDURE (OUTPATIENT)
Dept: CT IMAGING | Facility: CLINIC | Age: 50
End: 2025-03-17
Attending: OTOLARYNGOLOGY
Payer: COMMERCIAL

## 2025-03-17 DIAGNOSIS — J34.89 SILENT SINUS SYNDROME: ICD-10-CM

## 2025-03-17 DIAGNOSIS — J32.0 CHRONIC MAXILLARY SINUSITIS: ICD-10-CM

## 2025-03-17 PROCEDURE — 70486 CT MAXILLOFACIAL W/O DYE: CPT

## 2025-07-02 DIAGNOSIS — J34.89 SILENT SINUS SYNDROME: ICD-10-CM

## 2025-07-02 DIAGNOSIS — J32.0 CHRONIC MAXILLARY SINUSITIS: ICD-10-CM

## 2025-07-12 ENCOUNTER — HEALTH MAINTENANCE LETTER (OUTPATIENT)
Age: 50
End: 2025-07-12

## 2025-07-15 ENCOUNTER — OFFICE VISIT (OUTPATIENT)
Dept: OPTOMETRY | Facility: CLINIC | Age: 50
End: 2025-07-15
Payer: COMMERCIAL

## 2025-07-15 DIAGNOSIS — H04.123 DRY EYES: Primary | ICD-10-CM

## 2025-07-15 ASSESSMENT — REFRACTION_CURRENTRX
OD_AXIS: 135
OS_BASECURVE: 3.9/7.5
OS_SPHERE: -1.50
OS_AXIS: 045
OS_ADDL_SPECS: BOSTON XO BLUE, HYDRAPEG
OD_DIAMETER: 14.8
OD_BASECURVE: 4.0/7.5
OD_CYLINDER: -1.00
OD_SPHERE: -3.00
OS_CYLINDER: -1.50
OS_DIAMETER: 14.8

## 2025-07-15 NOTE — PROGRESS NOTES
No office visit. CL order only. Pt's right lens broke, order new and mail direct    Contact Lens Billing  V-Code:  - GP scleral  Final Contact Lens Rx         Brand Base Curve Diameter Sphere Cylinder Axis Lens Addl. Specs    Right Zenlens RC 4.0/7.5 14.8 -3.00 -1.00 135 +100um LC, 10 flat H x 2 flat V [place] XO clear, HydraPEG    Left Zenlens RC MONOVISION LENS 3.9/7.5 14.8 -1.50 -1.50 045 +100um LC, 10 flat H x 2 flat V [place] XO blue, HydraPEG           # of units: 1 right lens  Price per Unit: $250    This patient requires contact lenses that are medically necessary for either improvement in vision over spectacles, support of the ocular surface, or other therapeutic benefit. These are not cosmetic contact lenses.     Encounter Diagnosis   Name Primary?    Dry eyes Yes     Date of last eye exam: 12/1/23

## 2025-08-27 ENCOUNTER — OFFICE VISIT (OUTPATIENT)
Dept: OPHTHALMOLOGY | Facility: CLINIC | Age: 50
End: 2025-08-27
Attending: OPHTHALMOLOGY
Payer: COMMERCIAL

## 2025-08-27 DIAGNOSIS — H01.01B ULCERATIVE BLEPHARITIS OF UPPER AND LOWER EYELIDS OF BOTH EYES: ICD-10-CM

## 2025-08-27 DIAGNOSIS — H01.01A ULCERATIVE BLEPHARITIS OF UPPER AND LOWER EYELIDS OF BOTH EYES: ICD-10-CM

## 2025-08-27 DIAGNOSIS — H04.123 DRY EYE SYNDROME OF BOTH EYES: Primary | ICD-10-CM

## 2025-08-27 PROCEDURE — 99213 OFFICE O/P EST LOW 20 MIN: CPT | Mod: GC | Performed by: OPHTHALMOLOGY

## 2025-08-27 ASSESSMENT — VISUAL ACUITY
OS_SC+: +2
OD_SC+: -2
OD_SC: 20/20
METHOD: SNELLEN - LINEAR
OS_SC: 20/30

## 2025-08-27 ASSESSMENT — EXTERNAL EXAM - RIGHT EYE: OD_EXAM: ENOPHTHALMOS

## 2025-08-27 ASSESSMENT — TONOMETRY
OD_IOP_MMHG: 17
IOP_METHOD: ICARE
OS_IOP_MMHG: 20

## 2025-08-27 ASSESSMENT — EXTERNAL EXAM - LEFT EYE: OS_EXAM: NORMAL
